# Patient Record
Sex: FEMALE | Race: WHITE | NOT HISPANIC OR LATINO | Employment: UNEMPLOYED | URBAN - METROPOLITAN AREA
[De-identification: names, ages, dates, MRNs, and addresses within clinical notes are randomized per-mention and may not be internally consistent; named-entity substitution may affect disease eponyms.]

---

## 2017-01-25 ENCOUNTER — OFFICE VISIT (OUTPATIENT)
Dept: FAMILY MEDICINE | Facility: CLINIC | Age: 47
End: 2017-01-25
Payer: COMMERCIAL

## 2017-01-25 VITALS
OXYGEN SATURATION: 98 % | BODY MASS INDEX: 21.21 KG/M2 | TEMPERATURE: 97 F | HEART RATE: 82 BPM | HEIGHT: 66 IN | RESPIRATION RATE: 16 BRPM | DIASTOLIC BLOOD PRESSURE: 74 MMHG | SYSTOLIC BLOOD PRESSURE: 113 MMHG | WEIGHT: 132 LBS

## 2017-01-25 DIAGNOSIS — Z51.81 ENCOUNTER FOR THERAPEUTIC DRUG MONITORING: ICD-10-CM

## 2017-01-25 DIAGNOSIS — L30.9 ECZEMA, UNSPECIFIED TYPE: Primary | ICD-10-CM

## 2017-01-25 DIAGNOSIS — F31.9 BIPOLAR 1 DISORDER (H): ICD-10-CM

## 2017-01-25 LAB — GLUCOSE SERPL-MCNC: 96 MG/DL (ref 70–99)

## 2017-01-25 PROCEDURE — 99214 OFFICE O/P EST MOD 30 MIN: CPT | Performed by: PHYSICIAN ASSISTANT

## 2017-01-25 PROCEDURE — 82947 ASSAY GLUCOSE BLOOD QUANT: CPT | Performed by: PHYSICIAN ASSISTANT

## 2017-01-25 PROCEDURE — 36415 COLL VENOUS BLD VENIPUNCTURE: CPT | Performed by: PHYSICIAN ASSISTANT

## 2017-01-25 RX ORDER — CETIRIZINE HYDROCHLORIDE 10 MG/1
10 TABLET ORAL EVERY EVENING
Qty: 30 TABLET | Refills: 11 | Status: SHIPPED | OUTPATIENT
Start: 2017-01-25 | End: 2018-02-07

## 2017-01-25 RX ORDER — TRIAMCINOLONE ACETONIDE 1 MG/G
CREAM TOPICAL
Qty: 30 G | Refills: 0 | Status: SHIPPED | OUTPATIENT
Start: 2017-01-25 | End: 2018-01-25

## 2017-01-25 RX ORDER — CLOBETASOL PROPIONATE 0.5 MG/G
AEROSOL, FOAM TOPICAL
Qty: 50 G | Refills: 1 | Status: SHIPPED | OUTPATIENT
Start: 2017-01-25 | End: 2018-01-25

## 2017-01-25 ASSESSMENT — PAIN SCALES - GENERAL: PAINLEVEL: NO PAIN (0)

## 2017-01-25 NOTE — NURSING NOTE
"Chief Complaint   Patient presents with     LAB REQUEST     Hair/Scalp Problem       Initial /74 mmHg  Pulse 82  Temp(Src) 97  F (36.1  C) (Oral)  Resp 16  Ht 5' 5.5\" (1.664 m)  Wt 132 lb (59.875 kg)  BMI 21.62 kg/m2  SpO2 98%  LMP 12/08/2016  Breastfeeding? No Estimated body mass index is 21.62 kg/(m^2) as calculated from the following:    Height as of this encounter: 5' 5.5\" (1.664 m).    Weight as of this encounter: 132 lb (59.875 kg).  BP completed using cuff size: regular    Pilar Mota MA       "

## 2017-01-25 NOTE — PATIENT INSTRUCTIONS
How to contact your care team: [phone] Pharmacy [phone]   KARYNA PARRA MD KATYA GEORGIEV, PA-C CHRIS JONES, PA-C NAM HO, MD JONATHAN BATES, MD ARVIN VOCAL, MD    Clinic hours M-Th 7am-7pm Fri 7am-5pm.   Urgent care M-F 11am-9pm  Sat/Sun 9am-5pm.   Pharmacy   Mon-Th:  8:00am-8pm   Fri:  8:00am-6:00pm  Sat/Sun  8:00am-5:00 pm

## 2017-01-25 NOTE — PROGRESS NOTES
SUBJECTIVE:                                                    CC: scalp itching, lab test    Denilson Sweet is a 46 year old female who presents to clinic today for: 1) Bipolar disorder.  Followed by psychiatry, stable.  Needs a fasting blood sugar for monitoring of zyprexa.  Last year, blood sugar with her 's work was normal.  2) Scalp itching, rash.  History of sensitive skin, eczema as a child.  Rash is on neck and in the flexural creases. Has tried a selsun blue and other seborrhea shampoos without relief.  OTC cortaid seems to help temporarily.      Problem list and histories reviewed & adjusted, as indicated.  Additional history: as documented    Patient Active Problem List   Diagnosis     Genital herpes     CARDIOVASCULAR SCREENING; LDL GOAL LESS THAN 160     Bipolar 1 disorder (H)     Migraine     Past Surgical History   Procedure Laterality Date     Laparoscopy procedure unlisted       ABLATION     Biopsy breast       RT       Social History   Substance Use Topics     Smoking status: Never Smoker      Smokeless tobacco: Never Used     Alcohol Use: No     Family History   Problem Relation Age of Onset     OSTEOPOROSIS Mother      Cancer - colorectal Paternal Grandmother          Current Outpatient Prescriptions   Medication Sig Dispense Refill     cetirizine (ZYRTEC) 10 MG tablet Take 1 tablet (10 mg) by mouth every evening 30 tablet 11     triamcinolone (KENALOG) 0.1 % cream Apply sparingly to affected area three times daily for 14 days. 30 g 0     clobetasol propionate 0.05 % FOAM Apply sparingly to affected area twice daily as needed.  Do not apply to face. 50 g 1     levonorgestrel-ethinyl estradiol (SEASONALE) 0.15-0.03 MG per tablet Take 1 tablet by mouth daily 91 tablet 3     LORazepam (ATIVAN) 0.5 MG tablet Take 1 tablet by mouth 2 times daily as needed.       OLANZapine (ZYPREXA) 20 MG tablet Take 2 tablets by mouth At Bedtime.       Omega-3 Fatty Acids (FISH OIL PO) Take  by mouth. Pt  "unsure dose       Multiple Vitamin (MULTI-VITAMIN PO) Take  by mouth.       AMBIEN 10 MG PO TABS 1 TABLET AT BEDTIME       CALCIUM 600 + D OR None Entered       No Known Allergies    ROS:  Constitutional, neuro, ENT, endocrine, pulmonary, cardiac, gastrointestinal, genitourinary, musculoskeletal, integument and psychiatric systems are negative, except as otherwise noted.  INTEGUMENTARY/SKIN: POSITIVE for rash, scalp itching  PSYCHIATRIC: POSITIVE for hx of bipolar disorder    OBJECTIVE:                                                    /74 mmHg  Pulse 82  Temp(Src) 97  F (36.1  C) (Oral)  Resp 16  Ht 5' 5.5\" (1.664 m)  Wt 132 lb (59.875 kg)  BMI 21.62 kg/m2  SpO2 98%  LMP 12/08/2016  Breastfeeding? No  Body mass index is 21.62 kg/(m^2).     GENERAL APPEARANCE: healthy, alert and no distress  NECK: no adenopathy, no asymmetry, masses, or scars and thyroid normal to palpation  RESP: lungs clear to auscultation - no rales, rhonchi or wheezes  CV: regular rates and rhythm, normal S1 S2, no S3 or S4 and no murmur, click or rub  MS: extremities normal- no gross deformities noted  SKIN: eczematous rash on cubital fossa and posterior neck  NEURO: Normal strength and tone, mentation intact and speech normal  PSYCH: mentation appears normal and affect normal/bright       ASSESSMENT/PLAN:                                                    (L30.9) Eczema, unspecified type  (primary encounter diagnosis)  Plan: cetirizine (ZYRTEC) 10 MG tablet- daily inolone        (KENALOG) 0.1 % cream- bid to tid to affected areas prn, clobetasol propionate         0.05 % FOAM to scalp bid prn        Avoid use for longer than 14 days to avoid skin thinning    (F31.9) Bipolar 1 disorder (H)  Comment: stable  Plan: Glucose     (Z51.81) Encounter for therapeutic drug monitoring  Comment: zyprexa  Plan: Glucose          Follow up with Provider - on lab results     Rebecca Childs PA-C  [facility]    "

## 2017-01-25 NOTE — MR AVS SNAPSHOT
After Visit Summary   1/25/2017    Denilson Sweet    MRN: [id number]           Patient Information     Date Of Birth          1970        Visit Information        Provider Department      1/25/2017 11:00 AM Rebecca Childs PA-C [facility]        Today's Diagnoses     Eczema, unspecified type    -  1     Bipolar 1 disorder (H)         Encounter for therapeutic drug monitoring           Care Instructions    How to contact your care team: [phone] Pharmacy [phone]   KARYNA PARRA MD KATYA GEORGIEV, PA-C CHRIS JONES, PA-C NAM HO, MD JONATHAN BATES, MD ARVIN VOCAL, MD    Clinic hours M-Th 7am-7pm Fri 7am-5pm.   Urgent care M-F 11am-9pm  Sat/Sun 9am-5pm.   Pharmacy   Mon-Th:  8:00am-8pm   Fri:  8:00am-6:00pm  Sat/Sun  8:00am-5:00 pm             Follow-ups after your visit        Who to contact     If you have questions or need follow up information about today's clinic visit or your schedule please contact [facility] directly at [phone].  Normal or non-critical lab and imaging results will be communicated to you by [employer], letter or phone within 4 business days after the clinic has received the results. If you do not hear from us within 7 days, please contact the clinic through [employer] or phone. If you have a critical or abnormal lab result, we will notify you by phone as soon as possible.  Submit refill requests through [facility] or call your pharmacy and they will forward the refill request to us. Please allow 3 business days for your refill to be completed.          Additional Information About Your Visit        MyChart Information     [facility] gives you secure access to your electronic health record. If you see a primary care provider, you can also send messages to your care team and make appointments. If you have questions, please call your primary care clinic.  If you do not have a primary care provider,  "please call [phone] and they will assist you.        Care EveryWhere ID     This is your Care EveryWhere ID. This could be used by other organizations to access your [place] medical records  [id number]        Your Vitals Were     Pulse Temperature Respirations    82 97  F (36.1  C) (Oral) 16    Height BMI (Body Mass Index) Pulse Oximetry    5' 5.5\" (1.664 m) 21.62 kg/m2 98%    Last Period Breastfeeding?       12/08/2016 No        Blood Pressure from Last 3 Encounters:   01/25/17 113/74   11/16/16 126/67   11/04/15 116/72    Weight from Last 3 Encounters:   01/25/17 132 lb (59.875 kg)   11/16/16 132 lb (59.875 kg)   11/04/15 145 lb (65.772 kg)              We Performed the Following     Glucose          Today's Medication Changes          These changes are accurate as of: 1/25/17 11:28 AM.  If you have any questions, ask your nurse or doctor.               Start taking these medicines.        Dose/Directions    cetirizine 10 MG tablet   Commonly known as:  zyrTEC   Used for:  Eczema, unspecified type   Started by:  Rebecca Cihlds PA-C        Dose:  10 mg   Take 1 tablet (10 mg) by mouth every evening   Quantity:  30 tablet   Refills:  11       clobetasol propionate 0.05 % Foam   Used for:  Eczema, unspecified type   Started by:  Rebecca Childs PA-C        Apply sparingly to affected area twice daily as needed.  Do not apply to face.   Quantity:  50 g   Refills:  1       triamcinolone 0.1 % cream   Commonly known as:  KENALOG   Used for:  Eczema, unspecified type   Started by:  Rebecca Childs PA-C        Apply sparingly to affected area three times daily for 14 days.   Quantity:  30 g   Refills:  0            Where to get your medicines      These medications were sent to [facility]/pharmacy #[id number] - [place], [place] - [address]  [address] [address]     Phone:  [phone]    - cetirizine 10 MG tablet  - clobetasol propionate 0.05 % Foam  - triamcinolone 0.1 % " cream             Primary Care Provider Office Phone # Fax #    Rebecca Childs PA-C [phone]       [facility] [address]  [place] [address]        Thank you!     Thank you for choosing [facility]  for your care. Our goal is always to provide you with excellent care. Hearing back from our patients is one way we can continue to improve our services. Please take a few minutes to complete the written survey that you may receive in the mail after your visit with us. Thank you!             Your Updated Medication List - Protect others around you: Learn how to safely use, store and throw away your medicines at www.disposemymeds.org.          This list is accurate as of: 1/25/17 11:28 AM.  Always use your most recent med list.                   Brand Name Dispense Instructions for use    AMBIEN 10 MG tablet   Generic drug:  zolpidem      1 TABLET AT BEDTIME       CALCIUM 600 + D PO      None Entered       cetirizine 10 MG tablet    zyrTEC    30 tablet    Take 1 tablet (10 mg) by mouth every evening       clobetasol propionate 0.05 % Foam     50 g    Apply sparingly to affected area twice daily as needed.  Do not apply to face.       FISH OIL PO      Take  by mouth. Pt unsure dose       levonorgestrel-ethinyl estradiol 0.15-0.03 MG per tablet    SEASONALE    91 tablet    Take 1 tablet by mouth daily       LORazepam 0.5 MG tablet    ATIVAN     Take 1 tablet by mouth 2 times daily as needed.       MULTI-VITAMIN PO      Take  by mouth.       OLANZapine 20 MG tablet    zyPREXA     Take 2 tablets by mouth At Bedtime.       triamcinolone 0.1 % cream    KENALOG    30 g    Apply sparingly to affected area three times daily for 14 days.

## 2017-04-04 ENCOUNTER — RADIANT APPOINTMENT (OUTPATIENT)
Dept: GENERAL RADIOLOGY | Facility: CLINIC | Age: 47
End: 2017-04-04
Attending: PHYSICIAN ASSISTANT
Payer: COMMERCIAL

## 2017-04-04 ENCOUNTER — OFFICE VISIT (OUTPATIENT)
Dept: FAMILY MEDICINE | Facility: CLINIC | Age: 47
End: 2017-04-04
Payer: COMMERCIAL

## 2017-04-04 VITALS
HEIGHT: 65 IN | TEMPERATURE: 97 F | SYSTOLIC BLOOD PRESSURE: 119 MMHG | HEART RATE: 86 BPM | OXYGEN SATURATION: 98 % | WEIGHT: 127.13 LBS | DIASTOLIC BLOOD PRESSURE: 56 MMHG | BODY MASS INDEX: 21.18 KG/M2

## 2017-04-04 DIAGNOSIS — M25.552 HIP PAIN, LEFT: ICD-10-CM

## 2017-04-04 DIAGNOSIS — M76.892 HIP FLEXOR TENDINITIS, LEFT: Primary | ICD-10-CM

## 2017-04-04 DIAGNOSIS — H01.139 EYELID DERMATITIS, ECZEMATOUS, UNSPECIFIED LATERALITY: ICD-10-CM

## 2017-04-04 PROCEDURE — 73502 X-RAY EXAM HIP UNI 2-3 VIEWS: CPT | Mod: LT

## 2017-04-04 PROCEDURE — 99214 OFFICE O/P EST MOD 30 MIN: CPT | Performed by: PHYSICIAN ASSISTANT

## 2017-04-04 RX ORDER — METHOCARBAMOL 750 MG/1
750 TABLET, FILM COATED ORAL 3 TIMES DAILY PRN
Qty: 30 TABLET | Refills: 1 | Status: SHIPPED | OUTPATIENT
Start: 2017-04-04 | End: 2018-01-25

## 2017-04-04 RX ORDER — TRIAMCINOLONE ACETONIDE 0.25 MG/G
CREAM TOPICAL 2 TIMES DAILY
Qty: 45 G | Refills: 1 | Status: SHIPPED | OUTPATIENT
Start: 2017-04-04 | End: 2018-01-25

## 2017-04-04 RX ORDER — NAPROXEN 500 MG/1
500 TABLET ORAL 2 TIMES DAILY WITH MEALS
Qty: 60 TABLET | Refills: 1 | Status: SHIPPED | OUTPATIENT
Start: 2017-04-04 | End: 2018-02-07

## 2017-04-04 NOTE — NURSING NOTE
"Chief Complaint   Patient presents with     Musculoskeletal Problem       Initial /56  Pulse 86  Temp 97  F (36.1  C) (Oral)  Ht 5' 5\" (1.651 m)  Wt 127 lb 2 oz (57.7 kg)  SpO2 98%  Breastfeeding? No  BMI 21.15 kg/m2 Estimated body mass index is 21.15 kg/(m^2) as calculated from the following:    Height as of this encounter: 5' 5\" (1.651 m).    Weight as of this encounter: 127 lb 2 oz (57.7 kg).  Medication Reconciliation: complete     Annetta Shultz CMA      "

## 2017-04-04 NOTE — PROGRESS NOTES
SUBJECTIVE:                                                    Denilson Sweet is a 46 year old female who presents to clinic today for the following health issues:      Hip Pain     Onset: 6 months got worse 2-3 weeks     Description:   Location: anterior left hip   Character: Sharp and Dull ache    Intensity: 8/10    Progression of Symptoms: worse    Accompanying Signs & Symptoms:  Other symptoms: none   History:   Previous similar pain: no       Precipitating factors:   Trauma or overuse: YES- speed walks     Alleviating factors:  Improved by: nothing  Eczema  Has noticed that it came on face  Itchy, red eyelids     Therapies Tried and outcome: aleve          Problem list and histories reviewed & adjusted, as indicated.  Additional history: as documented    Patient Active Problem List   Diagnosis     Genital herpes     CARDIOVASCULAR SCREENING; LDL GOAL LESS THAN 160     Bipolar 1 disorder (H)     Migraine     Past Surgical History:   Procedure Laterality Date     BIOPSY BREAST      RT     LAPAROSCOPY PROCEDURE UNLISTED      ABLATION       Social History   Substance Use Topics     Smoking status: Never Smoker     Smokeless tobacco: Never Used     Alcohol use No     Family History   Problem Relation Age of Onset     OSTEOPOROSIS Mother      Cancer - colorectal Paternal Grandmother          Current Outpatient Prescriptions   Medication Sig Dispense Refill     triamcinolone (KENALOG) 0.025 % cream Apply topically 2 times daily Thin layer to face, use 2 weeks on, 1 week off 45 g 1     methocarbamol (ROBAXIN) 750 MG tablet Take 1 tablet (750 mg) by mouth 3 times daily as needed for muscle spasms 30 tablet 1     naproxen (NAPROSYN) 500 MG tablet Take 1 tablet (500 mg) by mouth 2 times daily (with meals) 60 tablet 1     cetirizine (ZYRTEC) 10 MG tablet Take 1 tablet (10 mg) by mouth every evening 30 tablet 11     triamcinolone (KENALOG) 0.1 % cream Apply sparingly to affected area three times daily for 14 days. 30 g 0  "    clobetasol propionate 0.05 % FOAM Apply sparingly to affected area twice daily as needed.  Do not apply to face. 50 g 1     levonorgestrel-ethinyl estradiol (SEASONALE) 0.15-0.03 MG per tablet Take 1 tablet by mouth daily 91 tablet 3     LORazepam (ATIVAN) 0.5 MG tablet Take 1 tablet by mouth 2 times daily as needed.       OLANZapine (ZYPREXA) 20 MG tablet Take 2 tablets by mouth At Bedtime.       Omega-3 Fatty Acids (FISH OIL PO) Take  by mouth. Pt unsure dose       Multiple Vitamin (MULTI-VITAMIN PO) Take  by mouth.       AMBIEN 10 MG PO TABS 1 TABLET AT BEDTIME       CALCIUM 600 + D OR None Entered       No Known Allergies    Reviewed and updated as needed this visit by clinical staff       Reviewed and updated as needed this visit by Provider         ROS:  Constitutional, HEENT, cardiovascular, pulmonary, gi and gu systems are negative, except as otherwise noted.    OBJECTIVE:                                                    /56  Pulse 86  Temp 97  F (36.1  C) (Oral)  Ht 5' 5\" (1.651 m)  Wt 127 lb 2 oz (57.7 kg)  SpO2 98%  Breastfeeding? No  BMI 21.15 kg/m2  Body mass index is 21.15 kg/(m^2).  GENERAL: healthy, alert and no distress  EYES: conjunctivae and sclerae normal and eyelids- dry, erythematous skin  MS: left hip exam shows normal strength and muscle mass, no deformities, no erythema, induration, or nodules, no evidence of joint effusion, ROM  is normal and no evidence of joint instability, tenderness on palpation of the left anterior hip flexors specifically sartorius region.     Diagnostic Test Results:  Xray - no fractures, no severe arthritis.      ASSESSMENT/PLAN:                                                        ICD-10-CM    1. Hip pain, left M25.552 XR Hip Left 2-3 Views   2. Eyelid dermatitis, eczematous, unspecified laterality H01.139 triamcinolone (KENALOG) 0.025 % cream   3. Hip flexor tendinitis, left M76.892 methocarbamol (ROBAXIN) 750 MG tablet     naproxen (NAPROSYN) " 500 MG tablet   1.Do daily stretches  Take Naproxen 500 mg twice a day with food for 2 weeks  Robaxin 750 mg three times a day for tightness as needed   Apply ice pack after prolonged walks   2. Triamcinolone 0.025% twice a day , use 2 weeks on, 1 week off.       Lisa Ennis PA-C  [facility]

## 2017-04-04 NOTE — PATIENT INSTRUCTIONS
Do daily stretches  Take Naproxen 500 mg twice a day with food for 2 weeks  Robaxin 750 mg three times a day for tightness as needed   Apply ice pack after prolonged walks   Lower Body Exercises: Hip Flexor        This exercise stretches and strengthens your lower body to help your back. As you work out, don t rush or strain. Use an exercise mat, pillow, or folded towel to protect your knees and other sensitive areas.    Kneel on the floor. Put one foot on the floor in front of you, with the knee slightly bent. If you need to, hold on to a chair for balance. Tighten your abdomen.    Move your hips forward, keeping your back and shoulders upright. Feel the stretch in the front of your hip.    Hold for 30-60 seconds. Return to starting position.    Repeat 2 times. Switch sides.     Repeat ___ times per day.  For your safety, check with your healthcare provider before starting an exercise program.     6351-1187 [employer]. [address] [place] [address], [place] [id number]. All rights reserved. This information is not intended as a substitute for professional medical care. Always follow your healthcare professional's instructions.

## 2017-04-04 NOTE — MR AVS SNAPSHOT
After Visit Summary   4/4/2017    Denilson Sweet    MRN: [id number]           Patient Information     Date Of Birth          1970        Visit Information        Provider Department      4/4/2017 3:20 PM Lisa Ennis PA-C [facility]        Today's Diagnoses     Hip pain, left    -  1    Eyelid dermatitis, eczematous, unspecified laterality        Hip flexor tendinitis, left          Care Instructions    Do daily stretches  Take Naproxen 500 mg twice a day with food for 2 weeks  Robaxin 750 mg three times a day for tightness as needed   Apply ice pack after prolonged walks   Lower Body Exercises: Hip Flexor        This exercise stretches and strengthens your lower body to help your back. As you work out, don t rush or strain. Use an exercise mat, pillow, or folded towel to protect your knees and other sensitive areas.    Kneel on the floor. Put one foot on the floor in front of you, with the knee slightly bent. If you need to, hold on to a chair for balance. Tighten your abdomen.    Move your hips forward, keeping your back and shoulders upright. Feel the stretch in the front of your hip.    Hold for 30-60 seconds. Return to starting position.    Repeat 2 times. Switch sides.     Repeat ___ times per day.  For your safety, check with your healthcare provider before starting an exercise program.     3585-8225 [employer]. [address] [place] [address]. All rights reserved. This information is not intended as a substitute for professional medical care. Always follow your healthcare professional's instructions.              Follow-ups after your visit        Who to contact     If you have questions or need follow up information about today's clinic visit or your schedule please contact [facility] directly at [phone].  Normal or non-critical lab and imaging results will be communicated to you by Reggie, letter  "or phone within 4 business days after the clinic has received the results. If you do not hear from us within 7 days, please contact the clinic through [employer] or phone. If you have a critical or abnormal lab result, we will notify you by phone as soon as possible.  Submit refill requests through [employer] or call your pharmacy and they will forward the refill request to us. Please allow 3 business days for your refill to be completed.          Additional Information About Your Visit        Bad[employer] Information     [employer] gives you secure access to your electronic health record. If you see a primary care provider, you can also send messages to your care team and make appointments. If you have questions, please call your primary care clinic.  If you do not have a primary care provider, please call [phone] and they will assist you.        Care EveryWhere ID     This is your Care EveryWhere ID. This could be used by other organizations to access your [place] medical records  [id number]        Your Vitals Were     Pulse Temperature Height Pulse Oximetry Breastfeeding? BMI (Body Mass Index)    86 97  F (36.1  C) (Oral) 5' 5\" (1.651 m) 98% No 21.15 kg/m2       Blood Pressure from Last 3 Encounters:   04/04/17 119/56   01/25/17 113/74   11/16/16 126/67    Weight from Last 3 Encounters:   04/04/17 127 lb 2 oz (57.7 kg)   01/25/17 132 lb (59.9 kg)   11/16/16 132 lb (59.9 kg)                 Today's Medication Changes          These changes are accurate as of: 4/4/17  4:06 PM.  If you have any questions, ask your nurse or doctor.               Start taking these medicines.        Dose/Directions    methocarbamol 750 MG tablet   Commonly known as:  ROBAXIN   Used for:  Hip flexor tendinitis, left   Started by:  Lisa Ennis PA-C        Dose:  750 mg   Take 1 tablet (750 mg) by mouth 3 times daily as needed for muscle spasms   Quantity:  30 tablet   Refills:  1       naproxen 500 MG tablet   Commonly " known as:  NAPROSYN   Used for:  Hip flexor tendinitis, left   Started by:  Lisa Ennis PA-C        Dose:  500 mg   Take 1 tablet (500 mg) by mouth 2 times daily (with meals)   Quantity:  60 tablet   Refills:  1         These medicines have changed or have updated prescriptions.        Dose/Directions    * triamcinolone 0.1 % cream   Commonly known as:  KENALOG   This may have changed:  Another medication with the same name was added. Make sure you understand how and when to take each.   Used for:  Eczema, unspecified type   Changed by:  Rebecca Childs PA-C        Apply sparingly to affected area three times daily for 14 days.   Quantity:  30 g   Refills:  0       * triamcinolone 0.025 % cream   Commonly known as:  KENALOG   This may have changed:  You were already taking a medication with the same name, and this prescription was added. Make sure you understand how and when to take each.   Used for:  Eyelid dermatitis, eczematous, unspecified laterality   Changed by:  Lisa Ennis PA-C        Apply topically 2 times daily Thin layer to face, use 2 weeks on, 1 week off   Quantity:  45 g   Refills:  1       * Notice:  This list has 2 medication(s) that are the same as other medications prescribed for you. Read the directions carefully, and ask your doctor or other care provider to review them with you.         Where to get your medicines      These medications were sent to [facility]/pharmacy #[id number] - [place], [place] - [address]  [address] [facility] [address]     Phone:  [phone]     methocarbamol 750 MG tablet    naproxen 500 MG tablet    triamcinolone 0.025 % cream                Primary Care Provider Office Phone # Fax #    Rebecca Childs PA-C [phone]       [facility] [address]  [facility] [address]        Thank you!     Thank you for choosing [facility]  for your care. Our goal is  always to provide you with excellent care. Hearing back from our patients is one way we can continue to improve our services. Please take a few minutes to complete the written survey that you may receive in the mail after your visit with us. Thank you!             Your Updated Medication List - Protect others around you: Learn how to safely use, store and throw away your medicines at www.disposemymeds.org.          This list is accurate as of: 4/4/17  4:06 PM.  Always use your most recent med list.                   Brand Name Dispense Instructions for use    AMBIEN 10 MG tablet   Generic drug:  zolpidem      1 TABLET AT BEDTIME       CALCIUM 600 + D PO      None Entered       cetirizine 10 MG tablet    zyrTEC    30 tablet    Take 1 tablet (10 mg) by mouth every evening       clobetasol propionate 0.05 % Foam     50 g    Apply sparingly to affected area twice daily as needed.  Do not apply to face.       FISH OIL PO      Take  by mouth. Pt unsure dose       levonorgestrel-ethinyl estradiol 0.15-0.03 MG per tablet    SEASONALE    91 tablet    Take 1 tablet by mouth daily       LORazepam 0.5 MG tablet    ATIVAN     Take 1 tablet by mouth 2 times daily as needed.       methocarbamol 750 MG tablet    ROBAXIN    30 tablet    Take 1 tablet (750 mg) by mouth 3 times daily as needed for muscle spasms       MULTI-VITAMIN PO      Take  by mouth.       naproxen 500 MG tablet    NAPROSYN    60 tablet    Take 1 tablet (500 mg) by mouth 2 times daily (with meals)       OLANZapine 20 MG tablet    zyPREXA     Take 2 tablets by mouth At Bedtime.       * triamcinolone 0.1 % cream    KENALOG    30 g    Apply sparingly to affected area three times daily for 14 days.       * triamcinolone 0.025 % cream    KENALOG    45 g    Apply topically 2 times daily Thin layer to face, use 2 weeks on, 1 week off       * Notice:  This list has 2 medication(s) that are the same as other medications prescribed for you. Read the directions carefully,  and ask your doctor or other care provider to review them with you.

## 2017-06-01 ENCOUNTER — OFFICE VISIT (OUTPATIENT)
Dept: FAMILY MEDICINE | Facility: CLINIC | Age: 47
End: 2017-06-01
Payer: COMMERCIAL

## 2017-06-01 VITALS
BODY MASS INDEX: 22.16 KG/M2 | SYSTOLIC BLOOD PRESSURE: 116 MMHG | HEART RATE: 96 BPM | HEIGHT: 65 IN | DIASTOLIC BLOOD PRESSURE: 69 MMHG | TEMPERATURE: 98.1 F | OXYGEN SATURATION: 96 % | WEIGHT: 133 LBS

## 2017-06-01 DIAGNOSIS — S60.229A CONTUSION OF HAND, UNSPECIFIED LATERALITY, INITIAL ENCOUNTER: ICD-10-CM

## 2017-06-01 DIAGNOSIS — S40.011A CONTUSION, SHOULDER AND UPPER ARM, MULTIPLE SITES, RIGHT, INITIAL ENCOUNTER: ICD-10-CM

## 2017-06-01 DIAGNOSIS — S40.021A CONTUSION, SHOULDER AND UPPER ARM, MULTIPLE SITES, RIGHT, INITIAL ENCOUNTER: ICD-10-CM
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
## 2017-06-01 DIAGNOSIS — Z23 NEED FOR TDAP VACCINATION: ICD-10-CM

## 2017-06-01 PROCEDURE — 90715 TDAP VACCINE 7 YRS/> IM: CPT | Performed by: FAMILY MEDICINE

## 2017-06-01 PROCEDURE — 99213 OFFICE O/P EST LOW 20 MIN: CPT | Mod: 25 | Performed by: FAMILY MEDICINE

## 2017-06-01 PROCEDURE — 90471 IMMUNIZATION ADMIN: CPT | Performed by: FAMILY MEDICINE

## 2017-06-01 RX ORDER — TRAZODONE HYDROCHLORIDE 100 MG/1
TABLET ORAL
Refills: 3 | COMMUNITY
Start: 2017-05-22 | End: 2019-11-06

## 2017-06-01 RX ORDER — ZOLPIDEM TARTRATE 5 MG/1
10 TABLET ORAL
COMMUNITY

## 2017-06-01 ASSESSMENT — PAIN SCALES - GENERAL: PAINLEVEL: SEVERE PAIN (7)
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
## 2017-06-01 NOTE — PATIENT INSTRUCTIONS
How to contact your care team: [phone] Pharmacy [phone]   KARYNA PARRA MD KATYA GEORGIEV, PA-C CHRIS JONES, PA-C NAM HO, MD JONATHAN BATES, MD ARVIN VOCAL, MD    Clinic hours M-Th 7am-7pm Fri 7am-5pm.   Urgent care M-F 11am-9pm  Sat/Sun 9am-5pm.   Pharmacy   Mon-Th:  8:00am-8pm   Fri:  8:00am-6:00pm  Sat/Sun  8:00am-5:00 pm       Shoulder Contusion  You have a shoulder injury called a contusion. This causes pain, swelling, and sometimes bruising on the skin. You don t have any broken bones. This injury will take from a few days to 6 weeks to heal, depending on how severe it is. Moderate to severe shoulder contusions are treated with a sling or shoulder immobilizer. Minor contusions can be treated without any special support.  Home care  Follow these tips when caring for yourself at home:    If you were given a sling to use, leave it in place for the time advised by your health care provider. If you aren t sure how long to wear it, ask for advice. If the sling becomes loose, adjust it so that your forearm is level with the ground. Your shoulder should feel well supported.    Put an ice pack on the injured area for 20 minutes every 1 to 2 hours the first day. You can make your own ice pack by putting ice cubes in a plastic bag. Wrap the bag in a thin towel. Continue with ice packs 3 to 4 times a day for the next 2 days. Then use the pack as needed to ease pain and swelling.    You may use acetaminophen or ibuprofen to control pain, unless another pain medicine was prescribed. If you have chronic liver or kidney disease, talk with your health care provider before using these medicines. Also talk with your provider if you ve had a stomach ulcer or GI bleeding.    Shoulder joints become stiff if left in a sling for too long. You should start range of motion exercises about 10 days after the injury. Talk with your provider to find out what type of exercises to do and how soon to  start.    Unless your provider told you otherwise, you can take the sling off to shower or bathe.  Follow-up care  Follow up with your health care provider if you don t start getting better in the next 5 days.  When to seek medical advice  Call your health care provider right away if any of these occur:    Pain or swelling gets worse or continues for more than a few days    Large amount of bruising on your shoulder or upper arm    Your hand or fingers become cold, blue, numb, or tingly    Difficulty moving your hand or fingers    Weakness in your hand or fingers    Your shoulder becomes stiff    Your shoulder feels like it is popping out    You aren t able to do your daily activities       0916-4207 [employer]. [address] [place] [address], [place] [address]. All rights reserved. This information is not intended as a substitute for professional medical care. Always follow your healthcare professional's instructions.        Upper Extremity Contusion  You have a contusion (bruise) of an upper extremity (arm, wrist, hand, or fingers). Symptoms include pain, swelling, and skin discoloration. No bones are broken. This injury may take from a few days to a few weeks to heal.  During that time, the bruise may change from reddish in color, to purple-blue, to green-yellow, to yellow-brown.  Home care    Unless another medication was prescribed, you can take acetaminophen, ibuprofen, or naproxen to control pain. (If you have chronic liver or kidney disease or ever had a stomach ulcer or GI bleeding, talk with your doctor before using these medicines.)    Elevate the injured area to reduce pain and swelling. As much as possible, sit or lie down with the injured area raised about the level of your heart. This is especially important during the first 48 hours.    Ice the injured area to help reduce pain and swelling. Wrap a cold source (ice pack or ice cubes in a plastic bag) in a thin towel. Apply to the bruised area  for 20 minutes every 1 to 2 hours the first day. Continue this 3 to 4 times a day until the pain and swelling goes away.    If a sling was provided, you may remove it to shower or bathe. To prevent joint stiffness, do not wear it for more than one week.  Follow up  Follow up with your health care provider or our staff as advised. Call if you are not improving within the next 1 to 2 weeks.  When to seek medical advice   Call your health care provider right away if any of these occur:    Increased pain or swelling    Hand or fingers become cold, blue, numb or tingly    Signs of infection: Warmth, drainage, or increased redness or pain around the injury    Inability to move the injured body part     Frequent bruising for unknown reasons    5927-1686 [employer]. [address] [place] [address], [place] [address]. All rights reserved. This information is not intended as a substitute for professional medical care. Always follow your healthcare professional's instructions.        Abrasions  Abrasions are skin scrapes. Their treatment depends on how large and deep the abrasion is.  Home care   You may be prescribed an antibiotic cream or ointment to apply to the wound. This helps prevent infection. Follow instructions when using this medication.  General care    To care for the abrasion, do the following each day for as long as directed by your health care provider.    If you were given a bandage, change it once a day. If your bandage sticks to the wound, soak it in warm water until it loosens.    Wash the area with soap and warm water. You may do this in a sink or under a tub faucet or shower. Rinse off the soap. Then pat the area dry with a clean towel.    If antibiotic ointment or cream was prescribed, reapply it to the wound as directed. Cover the wound with a fresh non-stick bandage. If the bandage becomes wet or dirty, change it as soon as possible.    You may use acetaminophen or ibuprofen to control pain  unless another pain medication was prescribed. Note: If you have chronic liver or kidney disease or ever had a stomach ulcer or GI bleeding, talk with your health care provider before using these medications. Do not use ibuprofen in children under six months of age.    Most skin wounds heal within ten days. But an infection may occur despite treatment. Therefore, monitor the wound for signs of infection as listed below.  Follow-up care  Follow up with your health care provider, or as advised.  When to seek medical advice  Call your health care provider right away if any of these occur:    Fever of 101 F (38.3 C) or higher, or as directed by your health care provider    Increasing pain, redness, swelling, or drainage from the wound    Bleeding from the wound that does not stop after a few minutes of steady, firm pressure    Decreased ability to move any body part near wound    6040-5398 [employer]. [address] [place] [address]. All rights reserved. This information is not intended as a substitute for professional medical care. Always follow your healthcare professional's instructions.
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
## 2017-07-07 ENCOUNTER — RADIANT APPOINTMENT (OUTPATIENT)
Dept: GENERAL RADIOLOGY | Facility: CLINIC | Age: 47
End: 2017-07-07
Attending: PHYSICIAN ASSISTANT
Payer: COMMERCIAL

## 2017-07-07 ENCOUNTER — OFFICE VISIT (OUTPATIENT)
Dept: FAMILY MEDICINE | Facility: CLINIC | Age: 47
End: 2017-07-07
Payer: COMMERCIAL

## 2017-07-07 VITALS
BODY MASS INDEX: 22.16 KG/M2 | TEMPERATURE: 96.7 F | SYSTOLIC BLOOD PRESSURE: 121 MMHG | WEIGHT: 133 LBS | OXYGEN SATURATION: 99 % | DIASTOLIC BLOOD PRESSURE: 69 MMHG | HEIGHT: 65 IN | HEART RATE: 90 BPM

## 2017-07-07 DIAGNOSIS — T14.8XXA BLISTERED SKIN: ICD-10-CM

## 2017-07-07 DIAGNOSIS — Z30.41 ENCOUNTER FOR SURVEILLANCE OF CONTRACEPTIVE PILLS: ICD-10-CM

## 2017-07-07 DIAGNOSIS — S49.91XD: ICD-10-CM

## 2017-07-07 DIAGNOSIS — S52.121A CLOSED DISPLACED FRACTURE OF HEAD OF RIGHT RADIUS, INITIAL ENCOUNTER: Primary | ICD-10-CM

## 2017-07-07 DIAGNOSIS — M25.521 RIGHT ELBOW PAIN: ICD-10-CM

## 2017-07-07 PROCEDURE — 73030 X-RAY EXAM OF SHOULDER: CPT | Mod: RT

## 2017-07-07 PROCEDURE — 99214 OFFICE O/P EST MOD 30 MIN: CPT | Performed by: PHYSICIAN ASSISTANT

## 2017-07-07 PROCEDURE — 73110 X-RAY EXAM OF WRIST: CPT | Mod: RT

## 2017-07-07 PROCEDURE — 73080 X-RAY EXAM OF ELBOW: CPT | Mod: RT

## 2017-07-07 RX ORDER — LEVONORGESTREL AND ETHINYL ESTRADIOL 0.15-0.03
1 KIT ORAL DAILY
Qty: 91 TABLET | Refills: 3 | Status: CANCELLED | OUTPATIENT
Start: 2017-07-07

## 2017-07-07 ASSESSMENT — PAIN SCALES - GENERAL: PAINLEVEL: NO PAIN (0)

## 2017-07-07 NOTE — PROGRESS NOTES
SUBJECTIVE:                                                    Denilson Sweet is a 46 year old female who presents to clinic today for the following health issues:      Medication Followup of Birth cobtrol    Taking Medication as prescribed: yes    Side Effects:  None    Medication Helping Symptoms:  Yes    Patient missed her last period. Initially patient was placed on oral contraception due to severe cramps during menses. Patient reports that she is infertile due to endometriosis and tubal injury. She doesnot suspect pregnancy.     Patient has not missed any oral contraception doses.      Musculoskeletal problem/pain      Duration: x 5/31/17    Description  Location: right arm    Intensity:  moderate    Accompanying signs and symptoms: loss of range of motion in elbow    History  Previous similar problem: YES    Previous evaluation:  Seen 6/1/17, no xray was done.     Precipitating or alleviating factors:  Trauma or overuse: YES  Aggravating factors include: exercise and overuse. Patient was told she has a contusion and that she could return to exercise in 2 weeks.     Therapies tried and outcome: nothing    Blisters       Duration: x 2 weeks    Description (location/character/radiation): both feet    Intensity:  mild    Accompanying signs and symptoms: none    History (similar episodes/previous evaluation): None    Precipitating or alleviating factors: None    Therapies tried and outcome: None           Problem list and histories reviewed & adjusted, as indicated.  Additional history: as documented    Patient Active Problem List   Diagnosis     Genital herpes     CARDIOVASCULAR SCREENING; LDL GOAL LESS THAN 160     Bipolar 1 disorder (H)     Migraine     Closed displaced fracture of head of right radius, initial encounter     Past Surgical History:   Procedure Laterality Date     BIOPSY BREAST      RT     LAPAROSCOPY PROCEDURE UNLISTED      ABLATION       Social History   Substance Use Topics     Smoking  "status: Never Smoker     Smokeless tobacco: Never Used     Alcohol use No     Family History   Problem Relation Age of Onset     OSTEOPOROSIS Mother      Cancer - colorectal Paternal Grandmother          Current Outpatient Prescriptions   Medication Sig Dispense Refill     traZODone (DESYREL) 100 MG tablet TAKE 1 TABLET BY MOUTH DAILY AT BEDTIME.  3     zolpidem (AMBIEN) 5 MG tablet Take by mouth nightly as needed for sleep       triamcinolone (KENALOG) 0.025 % cream Apply topically 2 times daily Thin layer to face, use 2 weeks on, 1 week off 45 g 1     methocarbamol (ROBAXIN) 750 MG tablet Take 1 tablet (750 mg) by mouth 3 times daily as needed for muscle spasms 30 tablet 1     naproxen (NAPROSYN) 500 MG tablet Take 1 tablet (500 mg) by mouth 2 times daily (with meals) 60 tablet 1     cetirizine (ZYRTEC) 10 MG tablet Take 1 tablet (10 mg) by mouth every evening 30 tablet 11     triamcinolone (KENALOG) 0.1 % cream Apply sparingly to affected area three times daily for 14 days. 30 g 0     clobetasol propionate 0.05 % FOAM Apply sparingly to affected area twice daily as needed.  Do not apply to face. 50 g 1     levonorgestrel-ethinyl estradiol (SEASONALE) 0.15-0.03 MG per tablet Take 1 tablet by mouth daily 91 tablet 3     LORazepam (ATIVAN) 0.5 MG tablet Take 1 tablet by mouth 2 times daily as needed.       OLANZapine (ZYPREXA) 20 MG tablet Take 2 tablets by mouth At Bedtime.       Omega-3 Fatty Acids (FISH OIL PO) Take  by mouth. Pt unsure dose       Multiple Vitamin (MULTI-VITAMIN PO) Take  by mouth.       CALCIUM 600 + D OR None Entered       No Known Allergies    Reviewed and updated as needed this visit by clinical staff  Tobacco  Allergies  Meds       Reviewed and updated as needed this visit by Provider         ROS:  Constitutional, HEENT, cardiovascular, pulmonary, gi and gu systems are negative, except as otherwise noted.    OBJECTIVE:     /69  Pulse 90  Temp 96.7  F (35.9  C) (Oral)  Ht 5' 5\" " (1.651 m)  Wt 133 lb (60.3 kg)  SpO2 99%  Breastfeeding? No  BMI 22.13 kg/m2  Body mass index is 22.13 kg/(m^2).  GENERAL: healthy, alert and no distress  MS: right elbow, mild swelling around radial head, LROM, incomplete extension, very tender with any attempted ROM..  Right wrist: no swelling, no erythema, no bruising, NROM  R shoulder:no swelling, no erythema, no bruising, NROM    SKIN: bilateral feet: healing dry blisters on the IP joints of several toes, no erythema, no swelling no tenderness, no drainage.   Diagnostic Test Results:  Xray - elbow xray- mildly displaced radial head fracture  wrist and shoulder xray is within normal limits.    ASSESSMENT/PLAN:       ICD-10-CM    1. Closed displaced fracture of head of right radius, initial encounter S52.121A ORTHO  REFERRAL   2. Injury of arm, right, subsequent encounter S49.91XD XR Wrist Right G/E 3 Views     XR Shoulder Right G/E 3 Views   3. Blistered skin T14.8    4. Encounter for surveillance of contraceptive pills Z30.41      1.Take Ibuprofen 600 mg every 6 hours as needed  for pain  Avoid excessive ROM and no lifting heavy items until cleared by orthopedist.   Patient declined arm sling.   Patient was explained that she may need to have cast or potentially a surgery per ortho decision.   2. Wrist and shoulder contusion is healing.  3. Healing naturally.  4. Patient would like to stop oral contraception and wait for cycle to restore naturally.   Patient declined UPT today.   Patient will follow up in 1 month if she does not get the menses to have FSH tested to rule out menopause.     Lisa Ennis PA-C  [facility]

## 2017-07-07 NOTE — PATIENT INSTRUCTIONS
Take Ibuprofen 600 mg every 6 hours as needed  for pain  Avoid excessive ROM and no lifting heavy items until cleared by orthopedist.       At [facility], we strive to deliver an exceptional experience to you, every time we see you.    If you receive a survey in the mail, please send us back your thoughts. We really do value your feedback.    Thank you for visiting [facility]    Normal or non-critical lab and imaging results will be communicated to you by MyChart, letter or phone within 7 days.  If you do not hear from us within 10 days, please call the clinic. If you have a critical or abnormal lab result, we will notify you by phone as soon as possible.     If you have any questions regarding your visit please contact:     Team [facility]  Clinic Hours Telephone Number   Dr. Juan Richardsonluis   7am-7pm  Monday through Thursday  7am-5pm Friday [phone]  Kamilah SCHAFFER RN   Pharmacy 8:30am-9pm Monday-Friday    9am-5pm Saturday-Sunday [phone]   Urgent Care 11am-9pm Monday-Friday        9am-5pm Saturday-Sunday [phone]     After hours, weekend or if you need to make an appointment with your primary provider please call [phone].   After Hours nurse advise: call [facility]: [phone]    Use [employer] (secure email communication and access to your chart) to send your primary care provider a message or make an appointment. Ask someone on your Team how to sign up for [employer]. To log on to "[employer]" or for more information in [employer] please visit the website at [email or web address].   As of October 8, 2013, all password changes, disabled accounts, or ID changes in [employer]/MyHealth will be done by our Access Services Department.   If you need help with your account or password, call: [phone]. Clinic staff no longer has the ability to change  passwords.     Elbow Fracture    You have a break (fracture) of one or more bones of your elbow joint. This may be a small crack in the bone. Or it may be a major break, with the broken parts pushed out of position.  This fracture usually takes 4 to 12 weeks to heal, depending on the type. The first step in treatment is with a splint or cast. Severe fractures may need surgery to put the bone fragments back into place.  Home care  Follow these guidelines when caring for yourself at home:    Keep your arm elevated to reduce pain and swelling. When sitting or lying down keep your arm above the level of your heart. You can do this by placing your arm on a pillow that rests on your chest or on a pillow at your side. This is most important during the first 2 days (48 hours) after the injury.    Put an ice pack on the injured area. Do this for 20 minutes every 1 to 2 hours the first day. You can make an ice pack by wrapping a plastic bag of ice cubes in a thin towel. As the ice melts, be careful that the cast or splint doesn t get wet. You can place the ice pack inside the sling and directly over the splint or cast. Continue to use the ice pack 3 to 4 times a day for the next 2 days. Then use the ice pack as needed to ease pain and swelling.    Keep the splint or cast completely dry at all times. Bathe with your splint or cast out of the water. Protect it with a large plastic bag, rubber-banded at the top end. If a fiberglass splint or cast gets wet, you can dry it with a hair dryer.    You may use acetaminophen or ibuprofen to control pain, unless another pain medicine was prescribed. If you have chronic liver or kidney disease, talk with your healthcare provider before using these medicines. Also talk with your provider if you ve had a stomach ulcer or gastrointestinal bleeding.    Don t put creams or objects under the cast if you have itching.  Follow-up care  Follow up with your healthcare provider in 1 week, or as  advised. This is to make sure the bone is healing the way it should. If a splint was put on, it may be changed to a cast during your follow-up visit.  X-rays may be taken. You will be told of any new findings that may affect your care.  When to seek medical advice  Call your healthcare provider right away if any of these occur:    The cast or splint cracks    The plaster cast or splint becomes wet or soft    The fiberglass cast or splint stays wet for more than 24 hours    Tightness or pain under the cast or splint gets worse    Bad odor from the cast or wound fluid stains the cast    Fingers become swollen, cold, blue, numb, or tingly    You can t move your fingers    Skin around cast becomes red    Fever of 100.4 F (38 C) or higher, or as directed by your healthcare provider   Date Last Reviewed: 2/6/2017 2000-2017 [employer]. [address] [place] [address], [place] [id number]. All rights reserved. This information is not intended as a substitute for professional medical care. Always follow your healthcare professional's instructions.

## 2017-07-07 NOTE — MR AVS SNAPSHOT
After Visit Summary   7/7/2017    Denilson Sweet    MRN: [id number]           Patient Information     Date Of Birth          1970        Visit Information        Provider Department      7/7/2017 1:20 PM Lisa Ennis PA-C [facility]        Today's Diagnoses     Right elbow pain    -  1    Encounter for surveillance of contraceptive pills        Closed displaced fracture of head of right radius, initial encounter          Care Instructions    Take Ibuprofen 600 mg every 6 hours as needed  for pain  Avoid excessive ROM and no lifting heavy items until cleared by orthopedist.       At [facility], we strive to deliver an exceptional experience to you, every time we see you.    If you receive a survey in the mail, please send us back your thoughts. We really do value your feedback.    Thank you for visiting [facility]    Normal or non-critical lab and imaging results will be communicated to you by MyChart, letter or phone within 7 days.  If you do not hear from us within 10 days, please call the clinic. If you have a critical or abnormal lab result, we will notify you by phone as soon as possible.     If you have any questions regarding your visit please contact:     Team [facility]ore[facility]Spirit  Clinic Hours Telephone Number   Dr. Juan العراقي   7am-7pm  Monday through Thursday  7am-5pm Friday [phone]  Kamilah SCHAFFER RN   Pharmacy 8:30am-9pm Monday-Friday    9am-5pm Saturday-Sunday [phone]   Urgent Care 11am-9pm Monday-Friday        9am-5pm Saturday-Sunday [phone]     After hours, weekend or if you need to make an appointment with your primary provider please call [phone].   After Hours nurse advise: call [facility]: [phone]    Use [employer] (secure email communication and access to your  chart) to send your primary care provider a message or make an appointment. Ask someone on your Team how to sign up for [employer]. To log on to [employer] or for more information in [employer] please visit the website at [email or web address].   As of October 8, 2013, all password changes, disabled accounts, or ID changes in [employer]/MyHealth will be done by our Access Services Department.   If you need help with your account or password, call: [phone]. Clinic staff no longer has the ability to change passwords.     Elbow Fracture    You have a break (fracture) of one or more bones of your elbow joint. This may be a small crack in the bone. Or it may be a major break, with the broken parts pushed out of position.  This fracture usually takes 4 to 12 weeks to heal, depending on the type. The first step in treatment is with a splint or cast. Severe fractures may need surgery to put the bone fragments back into place.  Home care  Follow these guidelines when caring for yourself at home:    Keep your arm elevated to reduce pain and swelling. When sitting or lying down keep your arm above the level of your heart. You can do this by placing your arm on a pillow that rests on your chest or on a pillow at your side. This is most important during the first 2 days (48 hours) after the injury.    Put an ice pack on the injured area. Do this for 20 minutes every 1 to 2 hours the first day. You can make an ice pack by wrapping a plastic bag of ice cubes in a thin towel. As the ice melts, be careful that the cast or splint doesn t get wet. You can place the ice pack inside the sling and directly over the splint or cast. Continue to use the ice pack 3 to 4 times a day for the next 2 days. Then use the ice pack as needed to ease pain and swelling.    Keep the splint or cast completely dry at all times. Bathe with your splint or cast out of the water. Protect it with a large plastic bag, rubber-banded at the top end. If a  fiberglass splint or cast gets wet, you can dry it with a hair dryer.    You may use acetaminophen or ibuprofen to control pain, unless another pain medicine was prescribed. If you have chronic liver or kidney disease, talk with your healthcare provider before using these medicines. Also talk with your provider if you ve had a stomach ulcer or gastrointestinal bleeding.    Don t put creams or objects under the cast if you have itching.  Follow-up care  Follow up with your healthcare provider in 1 week, or as advised. This is to make sure the bone is healing the way it should. If a splint was put on, it may be changed to a cast during your follow-up visit.  X-rays may be taken. You will be told of any new findings that may affect your care.  When to seek medical advice  Call your healthcare provider right away if any of these occur:    The cast or splint cracks    The plaster cast or splint becomes wet or soft    The fiberglass cast or splint stays wet for more than 24 hours    Tightness or pain under the cast or splint gets worse    Bad odor from the cast or wound fluid stains the cast    Fingers become swollen, cold, blue, numb, or tingly    You can t move your fingers    Skin around cast becomes red    Fever of 100.4 F (38 C) or higher, or as directed by your healthcare provider   Date Last Reviewed: 2/6/2017 2000-2017 [employer]. [address] [place] [address]. All rights reserved. This information is not intended as a substitute for professional medical care. Always follow your healthcare professional's instructions.                Follow-ups after your visit        Additional Services     ORTHO  REFERRAL       [facility] is referring you to the Orthopedic  Services at [facility].       The  Representative will assist you in the coordination of your Orthopedic and Musculoskeletal Care as prescribed by your  physician.    The [facility] Representative will call you within 1 business day to help schedule your appointment, or you may contact the [facility] Representative at:    All areas ~ [phone]     Type of Referral : Surgical / Specialist       Timeframe requested: 1 - 2 days    Coverage of these services is subject to the terms and limitations of your health insurance plan.  Please call member services at your health plan with any benefit or coverage questions.      If X-rays, CT or MRI's have been performed, please contact the facility where they were done to arrange for , prior to your scheduled appointment.  Please bring this referral request to your appointment and present it to your specialist.                  Who to contact     If you have questions or need follow up information about today's clinic visit or your schedule please contact [facility] directly at [phone].  Normal or non-critical lab and imaging results will be communicated to you by [employer], letter or phone within 4 business days after the clinic has received the results. If you do not hear from us within 7 days, please contact the clinic through [employer] or phone. If you have a critical or abnormal lab result, we will notify you by phone as soon as possible.  Submit refill requests through [employer] or call your pharmacy and they will forward the refill request to us. Please allow 3 business days for your refill to be completed.          Additional Information About Your Visit        [employer] Information     [employer] gives you secure access to your electronic health record. If you see a primary care provider, you can also send messages to your care team and make appointments. If you have questions, please call your primary care clinic.  If you do not have a primary care provider, please call [phone] and they will assist you.        Care EveryWhere ID     This is your Care EveryWhere ID. This could be used by  "other organizations to access your [facility] medical records  [id number]        Your Vitals Were     Pulse Temperature Height Pulse Oximetry Breastfeeding? BMI (Body Mass Index)    90 96.7  F (35.9  C) (Oral) 5' 5\" (1.651 m) 99% No 22.13 kg/m2       Blood Pressure from Last 3 Encounters:   07/07/17 121/69   06/01/17 116/69   04/04/17 119/56    Weight from Last 3 Encounters:   07/07/17 133 lb (60.3 kg)   06/01/17 133 lb (60.3 kg)   04/04/17 127 lb 2 oz (57.7 kg)              We Performed the Following     ORTHO  REFERRAL        Primary Care Provider Office Phone # Fax #    Lisa Ennis PA-C [phone]       [facility] [address]  [facility] [address]        Equal Access to Services     MARYJANE SHEPHERD : Hadii aad ku hadasho Soomaali, waaxda luqadaha, qaybta kaalmada adeegyada, waxay idiin hayaan ruddy marquis . So [facility] [phone].    ATENCIÓN: Si liu damico, tiene a colon disposición servicios gratuitos de asistencia lingüística. Llame al [phone].    We comply with applicable federal civil rights laws and [place] laws. We do not discriminate on the basis of race, color, national origin, age, disability sex, sexual orientation or gender identity.            Thank you!     Thank you for choosing [facility]  for your care. Our goal is always to provide you with excellent care. Hearing back from our patients is one way we can continue to improve our services. Please take a few minutes to complete the written survey that you may receive in the mail after your visit with us. Thank you!             Your Updated Medication List - Protect others around you: Learn how to safely use, store and throw away your medicines at www.disposemymeds.org.          This list is accurate as of: 7/7/17  2:22 PM.  Always use your most recent med list.                   Brand Name Dispense Instructions for use Diagnosis    AMBIEN 5 MG tablet "   Generic drug:  zolpidem      Take by mouth nightly as needed for sleep        CALCIUM 600 + D PO      None Entered        cetirizine 10 MG tablet    zyrTEC    30 tablet    Take 1 tablet (10 mg) by mouth every evening    Eczema, unspecified type       clobetasol propionate 0.05 % Foam     50 g    Apply sparingly to affected area twice daily as needed.  Do not apply to face.    Eczema, unspecified type       FISH OIL PO      Take  by mouth. Pt unsure dose        levonorgestrel-ethinyl estradiol 0.15-0.03 MG per tablet    SEASONALE    91 tablet    Take 1 tablet by mouth daily    Encounter for surveillance of contraceptive pills       LORazepam 0.5 MG tablet    ATIVAN     Take 1 tablet by mouth 2 times daily as needed.        methocarbamol 750 MG tablet    ROBAXIN    30 tablet    Take 1 tablet (750 mg) by mouth 3 times daily as needed for muscle spasms    Hip flexor tendinitis, left       MULTI-VITAMIN PO      Take  by mouth.        naproxen 500 MG tablet    NAPROSYN    60 tablet    Take 1 tablet (500 mg) by mouth 2 times daily (with meals)    Hip flexor tendinitis, left       OLANZapine 20 MG tablet    zyPREXA     Take 2 tablets by mouth At Bedtime.        traZODone 100 MG tablet    DESYREL     TAKE 1 TABLET BY MOUTH DAILY AT BEDTIME.        * triamcinolone 0.1 % cream    KENALOG    30 g    Apply sparingly to affected area three times daily for 14 days.    Eczema, unspecified type       * triamcinolone 0.025 % cream    KENALOG    45 g    Apply topically 2 times daily Thin layer to face, use 2 weeks on, 1 week off    Eyelid dermatitis, eczematous, unspecified laterality       * Notice:  This list has 2 medication(s) that are the same as other medications prescribed for you. Read the directions carefully, and ask your doctor or other care provider to review them with you.
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
## 2017-07-10 ENCOUNTER — OFFICE VISIT (OUTPATIENT)
Dept: ORTHOPEDICS | Facility: CLINIC | Age: 47
End: 2017-07-10
Payer: COMMERCIAL

## 2017-07-10 VITALS
BODY MASS INDEX: 22.16 KG/M2 | SYSTOLIC BLOOD PRESSURE: 112 MMHG | RESPIRATION RATE: 16 BRPM | HEART RATE: 59 BPM | WEIGHT: 133 LBS | DIASTOLIC BLOOD PRESSURE: 75 MMHG | OXYGEN SATURATION: 97 % | HEIGHT: 65 IN

## 2017-07-10 DIAGNOSIS — S52.121A CLOSED FRACTURE OF HEAD OF RIGHT RADIUS, INITIAL ENCOUNTER: Primary | ICD-10-CM

## 2017-07-10 PROCEDURE — 99243 OFF/OP CNSLTJ NEW/EST LOW 30: CPT | Performed by: ORTHOPAEDIC SURGERY

## 2017-07-10 ASSESSMENT — PAIN SCALES - GENERAL: PAINLEVEL: MODERATE PAIN (5)
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
## 2017-10-12 ENCOUNTER — RADIANT APPOINTMENT (OUTPATIENT)
Dept: GENERAL RADIOLOGY | Facility: CLINIC | Age: 47
End: 2017-10-12
Attending: ORTHOPAEDIC SURGERY
Payer: COMMERCIAL

## 2017-10-12 ENCOUNTER — OFFICE VISIT (OUTPATIENT)
Dept: ORTHOPEDICS | Facility: CLINIC | Age: 47
End: 2017-10-12
Payer: COMMERCIAL

## 2017-10-12 VITALS — BODY MASS INDEX: 22.42 KG/M2 | WEIGHT: 134.6 LBS | HEIGHT: 65 IN | RESPIRATION RATE: 16 BRPM

## 2017-10-12 DIAGNOSIS — S52.121A CLOSED DISPLACED FRACTURE OF HEAD OF RIGHT RADIUS, INITIAL ENCOUNTER: ICD-10-CM

## 2017-10-12 DIAGNOSIS — S52.121S CLOSED DISPLACED FRACTURE OF HEAD OF RIGHT RADIUS, SEQUELA: Primary | ICD-10-CM

## 2017-10-12 PROCEDURE — 73080 X-RAY EXAM OF ELBOW: CPT | Mod: RT

## 2017-10-12 PROCEDURE — 99212 OFFICE O/P EST SF 10 MIN: CPT | Performed by: ORTHOPAEDIC SURGERY

## 2017-10-12 ASSESSMENT — PAIN SCALES - GENERAL: PAINLEVEL: MILD PAIN (2)
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
## 2017-10-12 NOTE — NURSING NOTE
"Chief Complaint   Patient presents with     RECHECK     Right radial head fx. DOI 5/31/17, 5 month PO. Patient states her elbow is doing much better, still a little pain. She just wants to make sure it is healing correctly. Pain with certain motions or lifting certain weights. Her elbow will crack every once in a while.        Initial Resp 16  Ht 1.651 m (5' 5\")  Wt 61.1 kg (134 lb 9.6 oz)  BMI 22.4 kg/m2 Estimated body mass index is 22.4 kg/(m^2) as calculated from the following:    Height as of this encounter: 1.651 m (5' 5\").    Weight as of this encounter: 61.1 kg (134 lb 9.6 oz).  Medication Reconciliation: talya Arteaga Certified Medical Assistant    "
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
## 2017-10-12 NOTE — MR AVS SNAPSHOT
After Visit Summary   10/12/2017    Denilson Sweet    MRN: [id number]           Patient Information     Date Of Birth          1970        Visit Information        Provider Department      10/12/2017 3:15 PM Wei Matta MD [facility]        Today's Diagnoses     Closed displaced fracture of head of right radius, sequela    -  1      Care Instructions    Please remember to call and schedule a follow up appointment if one was recommended at your earliest convenience.  Orthopedics CLINIC HOURS TELEPHONE NUMBER   Dr. Sigrid Marquez  Certified Medical Assistant   Monday & Wednesday   8am - 5pm  Thursday 1pm - 5pm  Friday 8am -11:30am Specialty schedulers:   [phone] to schedule your surgery.  Main Clinic:   [phone] to make an appointment with any provider.    Urgent Care locations:    [facility] Monday-Friday Closed  Saturday-Sunday 9am-5pm      Monday-Friday 12pm - 8pm  Saturday-Sunday 9am-5pm [phone]     If SURGERY has been recommended, please call our Specialty Schedulers at [phone] to schedule your procedure.    If you need a medication refill, please contact your pharmacy. Please allow 3 business days for your refill to be completed.    If an MRI or CT scan has been recommended, please call [facility] Schedulers at [phone] to schedule your appointment.  Use LoopItt (secure e-mail communication and access to your chart) to send a message or to make an appointment. Please ask how you can sign up for ByteShield.  Your care team's suggested websites for health information:   [email or web address] : Up to date and easily searchable information on multiple topics.   [email or web address] : [place] dept of heat, public health issues in [place], N1N1              Follow-ups after your visit        Follow-up notes from your care team     Return if symptoms worsen or fail to improve.     "  Who to contact     If you have questions or need follow up information about today's clinic visit or your schedule please contact [facility] directly at [phone].  Normal or non-critical lab and imaging results will be communicated to you by MyChart, letter or phone within 4 business days after the clinic has received the results. If you do not hear from us within 7 days, please contact the clinic through MyChart or phone. If you have a critical or abnormal lab result, we will notify you by phone as soon as possible.  Submit refill requests through Caustic Graphics or call your pharmacy and they will forward the refill request to us. Please allow 3 business days for your refill to be completed.          Additional Information About Your Visit        Querylyhart Information     Ca[employer]ic Graphics gives you secure access to your electronic health record. If you see a primary care provider, you can also send messages to your care team and make appointments. If you have questions, please call your primary care clinic.  If you do not have a primary care provider, please call [phone] and they will assist you.        Care EveryWhere ID     This is your Care EveryWhere ID. This could be used by other organizations to access your [facility] medical records  [id number]        Your Vitals Were     Respirations Height BMI (Body Mass Index)             16 5' 5\" (1.651 m) 22.4 kg/m2          Blood Pressure from Last 3 Encounters:   07/10/17 112/75   07/07/17 121/69   06/01/17 116/69    Weight from Last 3 Encounters:   10/12/17 134 lb 9.6 oz (61.1 kg)   07/10/17 133 lb (60.3 kg)   07/07/17 133 lb (60.3 kg)               Primary Care Provider Office Phone # Fax #    Lisa Ennis PA-C [phone]       [address]  [facility] [address]        Equal Access to Services     MARYJANE SHEPHERD AH: Hadii aad ku hadasho Soomaali, waaxda luqadaha, qaybta kaalmada jennifer hernándezin " dylan parveenjuan f genarokev marquis ah. So [facility] [phone].    ATENCIÓN: Si liu damico, tiene a colon disposición servicios gratuitos de asistencia lingüística. Alma gipson [phone].    We comply with applicable federal civil rights laws and [place] laws. We do not discriminate on the basis of race, color, national origin, age, disability, sex, sexual orientation, or gender identity.            Thank you!     Thank you for choosing [facility]  for your care. Our goal is always to provide you with excellent care. Hearing back from our patients is one way we can continue to improve our services. Please take a few minutes to complete the written survey that you may receive in the mail after your visit with us. Thank you!             Your Updated Medication List - Protect others around you: Learn how to safely use, store and throw away your medicines at www.disposemymeds.org.          This list is accurate as of: 10/12/17  4:03 PM.  Always use your most recent med list.                   Brand Name Dispense Instructions for use Diagnosis    AMBIEN 5 MG tablet   Generic drug:  zolpidem      Take by mouth nightly as needed for sleep        CALCIUM 600 + D PO      None Entered        cetirizine 10 MG tablet    zyrTEC    30 tablet    Take 1 tablet (10 mg) by mouth every evening    Eczema, unspecified type       clobetasol propionate 0.05 % Foam     50 g    Apply sparingly to affected area twice daily as needed.  Do not apply to face.    Eczema, unspecified type       FISH OIL PO      Take  by mouth. Pt unsure dose        levonorgestrel-ethinyl estradiol 0.15-0.03 MG per tablet    SEASONALE    91 tablet    Take 1 tablet by mouth daily    Encounter for surveillance of contraceptive pills       LORazepam 0.5 MG tablet    ATIVAN     Take 1 tablet by mouth 2 times daily as needed.        methocarbamol 750 MG tablet    ROBAXIN    30 tablet    Take 1 tablet (750 mg) by mouth 3 times daily as needed for  muscle spasms    Hip flexor tendinitis, left       MULTI-VITAMIN PO      Take  by mouth.        naproxen 500 MG tablet    NAPROSYN    60 tablet    Take 1 tablet (500 mg) by mouth 2 times daily (with meals)    Hip flexor tendinitis, left       OLANZapine 20 MG tablet    zyPREXA     Take 2 tablets by mouth At Bedtime.        traZODone 100 MG tablet    DESYREL     TAKE 1 TABLET BY MOUTH DAILY AT BEDTIME.        * triamcinolone 0.1 % cream    KENALOG    30 g    Apply sparingly to affected area three times daily for 14 days.    Eczema, unspecified type       * triamcinolone 0.025 % cream    KENALOG    45 g    Apply topically 2 times daily Thin layer to face, use 2 weeks on, 1 week off    Eyelid dermatitis, eczematous, unspecified laterality       * Notice:  This list has 2 medication(s) that are the same as other medications prescribed for you. Read the directions carefully, and ask your doctor or other care provider to review them with you.

## 2017-12-28 ENCOUNTER — TELEPHONE (OUTPATIENT)
Dept: FAMILY MEDICINE | Facility: CLINIC | Age: 47
End: 2017-12-28

## 2017-12-28 NOTE — TELEPHONE ENCOUNTER
Panel Management Review      BP Readings from Last 1 Encounters:   07/10/17 112/75    , No results found for: A1C, 7/7/2017  Last Office Visit with this department: 7/7/2017    Fail List measure: PAP      Patient is due/failing the following:   PAP    Action needed:   Cervical cancer screening    Type of outreach:    Sent letter.    Questions for provider review:    None                                                                                                                                    Trisha Fuchs MA      Chart routed to  .

## 2017-12-28 NOTE — LETTER
December 28, 2017      Denilson Sweet  [address]  [facility] [address]          Dear Denilson Sweet,     At [facility] we care about your health and are committed to providing quality patient care.    Which includes staying current on preventive cancer screenings.  You can increase your chances of finding and treating cancers through regular screenings.      Our records indicate you may be due for the following preventive screening(s):    Cervical Cancer Screening    Pap smear is a screening test used to detect cervical cancer. It is recommended every three years for women 21 and older. The test should be done at least once every three years but women who are at greater risk for cervical cancer may need to have the test more often.    To schedule an appointment or discuss this screening further, you may contact us by phone at the [facility] at [phone] or online through the patient portal/Findline @ [email or web address]    If you have had any of the screenings listed above at another facility, please call us so that we may update your chart.      Your partners in health,      Quality Committee at [facility]/CHEKO

## 2017-12-30 ENCOUNTER — HEALTH MAINTENANCE LETTER (OUTPATIENT)
Age: 47
End: 2017-12-30

## 2018-01-25 ENCOUNTER — OFFICE VISIT (OUTPATIENT)
Dept: FAMILY MEDICINE | Facility: CLINIC | Age: 48
End: 2018-01-25
Payer: COMMERCIAL

## 2018-01-25 VITALS
OXYGEN SATURATION: 98 % | WEIGHT: 134.9 LBS | HEART RATE: 80 BPM | HEIGHT: 65 IN | BODY MASS INDEX: 22.48 KG/M2 | DIASTOLIC BLOOD PRESSURE: 66 MMHG | TEMPERATURE: 97.7 F | SYSTOLIC BLOOD PRESSURE: 112 MMHG

## 2018-01-25 DIAGNOSIS — N95.1 PERIMENOPAUSAL: ICD-10-CM

## 2018-01-25 DIAGNOSIS — B35.1 TOENAIL FUNGUS: ICD-10-CM

## 2018-01-25 DIAGNOSIS — L90.5 SCAR: ICD-10-CM

## 2018-01-25 DIAGNOSIS — L84 CORN OR CALLUS: ICD-10-CM

## 2018-01-25 DIAGNOSIS — K64.4 EXTERNAL HEMORRHOIDS: ICD-10-CM

## 2018-01-25 DIAGNOSIS — Z12.4 SCREENING FOR MALIGNANT NEOPLASM OF CERVIX: ICD-10-CM

## 2018-01-25 DIAGNOSIS — Z00.00 ROUTINE HISTORY AND PHYSICAL EXAMINATION OF ADULT: Primary | ICD-10-CM

## 2018-01-25 LAB
FSH SERPL-ACNC: 11.4 IU/L
HCG SERPL QL: NEGATIVE
TSH SERPL DL<=0.005 MIU/L-ACNC: 0.86 MU/L (ref 0.4–4)

## 2018-01-25 PROCEDURE — 99213 OFFICE O/P EST LOW 20 MIN: CPT | Mod: 25 | Performed by: PHYSICIAN ASSISTANT

## 2018-01-25 PROCEDURE — 11056 PARNG/CUTG B9 HYPRKR LES 2-4: CPT | Performed by: PHYSICIAN ASSISTANT

## 2018-01-25 PROCEDURE — 99396 PREV VISIT EST AGE 40-64: CPT | Mod: 25 | Performed by: PHYSICIAN ASSISTANT

## 2018-01-25 PROCEDURE — 83001 ASSAY OF GONADOTROPIN (FSH): CPT | Performed by: PHYSICIAN ASSISTANT

## 2018-01-25 PROCEDURE — 36415 COLL VENOUS BLD VENIPUNCTURE: CPT | Performed by: PHYSICIAN ASSISTANT

## 2018-01-25 PROCEDURE — 84703 CHORIONIC GONADOTROPIN ASSAY: CPT | Performed by: PHYSICIAN ASSISTANT

## 2018-01-25 PROCEDURE — 84443 ASSAY THYROID STIM HORMONE: CPT | Performed by: PHYSICIAN ASSISTANT

## 2018-01-25 PROCEDURE — G0145 SCR C/V CYTO,THINLAYER,RESCR: HCPCS | Performed by: PHYSICIAN ASSISTANT

## 2018-01-25 PROCEDURE — 87624 HPV HI-RISK TYP POOLED RSLT: CPT | Performed by: PHYSICIAN ASSISTANT

## 2018-01-25 NOTE — Clinical Note
Krysta,  can you please review if I can do additional [phone] in this case  Thank you  Benita Ennis PAC

## 2018-01-25 NOTE — MR AVS SNAPSHOT
After Visit Summary   1/25/2018    Denilson Sweet    MRN: [id number]           Patient Information     Date Of Birth          1970        Visit Information        Provider Department      1/25/2018 2:20 PM Lisa Ennis PA-C [facility]        Today's Diagnoses     Routine history and physical examination of adult    -  1    Screening for malignant neoplasm of cervix        Perimenopausal        Scar        Toenail fungus        External hemorrhoids        Corn or callus          Care Instructions      Preventive Health Recommendations  Female Ages 40 to 49    Yearly exam:     See your health care provider every year in order to  1. Review health changes.   2. Discuss preventive care.    3. Review your medicines if your doctor prescribed any.      Get a Pap test every three years (unless you have an abnormal result and your provider advises testing more often).      If you get Pap tests with HPV test, you only need to test every 5 years, unless you have an abnormal result. You do not need a Pap test if your uterus was removed (hysterectomy) and you have not had cancer.      You should be tested each year for STDs (sexually transmitted diseases), if you're at risk.       Ask your doctor if you should have a mammogram.      Have a colonoscopy (test for colon cancer) if someone in your family has had colon cancer or polyps before age 50.       Have a cholesterol test every 5 years.       Have a diabetes test (fasting glucose) after age 45. If you are at risk for diabetes, you should have this test every 3 years.    Shots: Get a flu shot each year. Get a tetanus shot every 10 years.     Nutrition:     Eat at least 5 servings of fruits and vegetables each day.    Eat whole-grain bread, whole-wheat pasta and brown rice instead of white grains and rice.    Talk to your provider about Calcium and Vitamin D.     Lifestyle    Exercise at least 150 minutes a week (an  average of 30 minutes a day, 5 days a week). This will help you control your weight and prevent disease.    Limit alcohol to one drink per day.    No smoking.     Wear sunscreen to prevent skin cancer.    See your dentist every six months for an exam and cleaning.          Follow-ups after your visit        Additional Services     COLORECTAL SURGERY REFERRAL       Your provider has referred you to: [facility]: Colon and Rectal Surgery Clinic [facility] [phone]   [email or web address]    Referral Reason(s): Hemorrhoids  Special Concerns: None  This referral is: Elective (week +)  It is OK to leave a message on patient's voicemail.    Please be aware that coverage of these services is subject to the terms and limitations of your health insurance plan.  Call member services at your health plan with any benefit or coverage questions.      Please bring the following with you to your appointment:    (1) Any X-Rays, CTs or MRIs which have been performed.  Contact the facility where they were done to arrange for  prior to your scheduled appointment.    (2) List of current medications  (3) This referral request   (4) Any documents/labs given to you for this referral                  Who to contact     If you have questions or need follow up information about today's clinic visit or your schedule please contact [facility] directly at [phone].  Normal or non-critical lab and imaging results will be communicated to you by MyChart, letter or phone within 4 business days after the clinic has received the results. If you do not hear from us within 7 days, please contact the clinic through MyChart or phone. If you have a critical or abnormal lab result, we will notify you by phone as soon as possible.  Submit refill requests through MobileAware or call your pharmacy and they will forward the refill request to us. Please allow 3 business days for your  "refill to be completed.          Additional Information About Your Visit        Platogohart Information     Kanshu gives you secure access to your electronic health record. If you see a primary care provider, you can also send messages to your care team and make appointments. If you have questions, please call your primary care clinic.  If you do not have a primary care provider, please call [phone] and they will assist you.        Care EveryWhere ID     This is your Care EveryWhere ID. This could be used by other organizations to access your [facility] medical records  [id number]        Your Vitals Were     Pulse Temperature Height Last Period Pulse Oximetry BMI (Body Mass Index)    80 97.7  F (36.5  C) (Oral) 5' 5.35\" (1.66 m) 07/10/2017 (Exact Date) 98% 22.21 kg/m2       Blood Pressure from Last 3 Encounters:   01/25/18 112/66   07/10/17 112/75   07/07/17 121/69    Weight from Last 3 Encounters:   01/25/18 134 lb 14.4 oz (61.2 kg)   10/12/17 134 lb 9.6 oz (61.1 kg)   07/10/17 133 lb (60.3 kg)              We Performed the Following     COLORECTAL SURGERY REFERRAL     Follicle stimulating hormone     HPV High Risk Types DNA Cervical     Pap imaged thin layer screen with HPV - recommended age 30 - 65 years (select HPV order below)     TRIM HYPERKERATOTIC SKIN LESION,2-4          Today's Medication Changes          These changes are accurate as of 1/25/18  3:27 PM.  If you have any questions, ask your nurse or doctor.               Start taking these medicines.        Dose/Directions    terbinafine 1 % Gel   Commonly known as:  LAMISIL ADVANCED   Used for:  Toenail fungus   Started by:  Lisa Ennis PA-C        Dose:  0.25 inch   Apply 0.25 inches topically 2 times daily   Quantity:  30 g   Refills:  11            Where to get your medicines      These medications were sent to [facility]/pharmacy #[id number] - [facility], [place] - [address]  [address] [facility] [address]     Phone:  " [phone]     terbinafine 1 % Gel                Primary Care Provider Office Phone # Fax #    Lisa Ennis PA-C [phone] [address]  [facility] [address]        Equal Access to Services     KELLEY SHEPHERD : Hadii aad ku hadasho Soomaali, waaxda luqadaha, qaybta kaalmada adeegyada, waxay idiin hayaan adeeg khlandonarmin laluisito hinton. So [facility] [phone].    ATENCIÓN: Si habla español, tiene a colon disposición servicios gratuitos de asistencia lingüística. Alma al [phone].    We comply with applicable federal civil rights laws and [place] laws. We do not discriminate on the basis of race, color, national origin, age, disability, sex, sexual orientation, or gender identity.            Thank you!     Thank you for choosing [facility]  for your care. Our goal is always to provide you with excellent care. Hearing back from our patients is one way we can continue to improve our services. Please take a few minutes to complete the written survey that you may receive in the mail after your visit with us. Thank you!             Your Updated Medication List - Protect others around you: Learn how to safely use, store and throw away your medicines at www.disposemymeds.org.          This list is accurate as of 1/25/18  3:27 PM.  Always use your most recent med list.                   Brand Name Dispense Instructions for use Diagnosis    AMBIEN 5 MG tablet   Generic drug:  zolpidem      Take by mouth nightly as needed for sleep        CALCIUM 600 + D PO      None Entered        cetirizine 10 MG tablet    zyrTEC    30 tablet    Take 1 tablet (10 mg) by mouth every evening    Eczema, unspecified type       FISH OIL PO      Take  by mouth. Pt unsure dose        LORazepam 0.5 MG tablet    ATIVAN     Take 1 tablet by mouth 2 times daily as needed.        MULTI-VITAMIN PO      Take  by mouth.        naproxen 500 MG tablet    NAPROSYN    60 tablet    Take 1 tablet (500 mg)  by mouth 2 times daily (with meals)    Hip flexor tendinitis, left       OLANZapine 20 MG tablet    zyPREXA     Take 2 tablets by mouth At Bedtime.        terbinafine 1 % Gel    LAMISIL ADVANCED    30 g    Apply 0.25 inches topically 2 times daily    Toenail fungus       traZODone 100 MG tablet    DESYREL     TAKE 1 TABLET BY MOUTH DAILY AT BEDTIME.

## 2018-01-30 LAB
COPATH REPORT: NORMAL
PAP: NORMAL

## 2018-01-31 LAB
FINAL DIAGNOSIS: NORMAL
HPV HR 12 DNA CVX QL NAA+PROBE: NEGATIVE
HPV16 DNA SPEC QL NAA+PROBE: NEGATIVE
HPV18 DNA SPEC QL NAA+PROBE: NEGATIVE
SPECIMEN DESCRIPTION: NORMAL
SPECIMEN SOURCE CVX/VAG CYTO: NORMAL

## 2018-02-01 NOTE — TELEPHONE ENCOUNTER
APPT INFO    Date /Time: 2/7/18   Reason for Appt: Hemorrhoids   Ref Provider/Clinic: [facility]   Are there internal records? Yes/No?  IF YES, list clinic names: Yes  See Above   Are there outside records? Yes/No? No   Patient Contact (Y/N) & Call Details: No referred   Action: Reviewed records; Records are in EPIC     OUTSIDE RECORDS CHECKLIST     CLINIC NAME COMMENTS REC (x) IMG (x)

## 2018-02-04 ASSESSMENT — ENCOUNTER SYMPTOMS
BOWEL INCONTINENCE: 0
DECREASED CONCENTRATION: 1
HEARTBURN: 0
INSOMNIA: 0
DEPRESSION: 1
CONSTIPATION: 1
RECTAL PAIN: 1
JAUNDICE: 0
ABDOMINAL PAIN: 0
VOMITING: 0
NAUSEA: 0
BLOOD IN STOOL: 1
BLOATING: 0
DIARRHEA: 0
NERVOUS/ANXIOUS: 1
PANIC: 0

## 2018-02-06 ENCOUNTER — TELEPHONE (OUTPATIENT)
Dept: SURGERY | Facility: CLINIC | Age: 48
End: 2018-02-06

## 2018-02-06 NOTE — TELEPHONE ENCOUNTER
Lm for pt reminding her of her appt with Valerie Escalera NP on 2/7/18 @ 2:30pm. Left direct number for pt to call back if she needs to cx or r/s.   Shefali CLEARY LPN

## 2018-02-07 ENCOUNTER — OFFICE VISIT (OUTPATIENT)
Dept: SURGERY | Facility: CLINIC | Age: 48
End: 2018-02-07
Payer: COMMERCIAL

## 2018-02-07 ENCOUNTER — PRE VISIT (OUTPATIENT)
Dept: SURGERY | Facility: CLINIC | Age: 48
End: 2018-02-07

## 2018-02-07 VITALS
TEMPERATURE: 98.2 F | WEIGHT: 137.4 LBS | DIASTOLIC BLOOD PRESSURE: 69 MMHG | OXYGEN SATURATION: 98 % | HEART RATE: 83 BPM | BODY MASS INDEX: 22.62 KG/M2 | SYSTOLIC BLOOD PRESSURE: 108 MMHG

## 2018-02-07 DIAGNOSIS — K60.2 ANAL FISSURE: Primary | ICD-10-CM

## 2018-02-07 ASSESSMENT — PAIN SCALES - GENERAL: PAINLEVEL: MILD PAIN (2)

## 2018-02-07 NOTE — MR AVS SNAPSHOT
After Visit Summary   2/7/2018    Denilson Sweet    MRN: [id number]           Patient Information     Date Of Birth          1970        Visit Information        Provider Department      2/7/2018 2:30 PM Valerie Pulido APRN CNP [facility] Colon and Rectal Surgery        Today's Diagnoses     Anal fissure    -  1      Care Instructions    1. Diltiazem ointment 2% to be applied 3 times daily for 8 weeks  2. Fiber supplement such as Metamcuil, Citrucel, or Benefiber once to twice a day  3. MiraLax or Milk of Magnesia if still constipated  4. Drink 10 glasses of water a day  5. Tylenol, ibuprofen, and warm tub baths/sitz baths for pain  6. Follow up in 8 weeks. Follow up sooner if symptoms do not improve after 4 weeks.            Follow-ups after your visit        Your next 10 appointments already scheduled     Feb 07, 2018  2:30 PM CST   (Arrive by 2:15 PM)   New Patient Visit with CALIXTO Melendrez CNP [facility] Colon and Rectal Surgery ([facility] [place])    [address] [place] [id number] [phone]              Who to contact     Please call your clinic at [phone] to:    Ask questions about your health    Make or cancel appointments    Discuss your medicines    Learn about your test results    Speak to your doctor   If you have compliments or concerns about an experience at your clinic, or if you wish to file a complaint, please contact [facility] Patient Relations at [phone] or email us at [email or web address]         Additional Information About Your Visit        MyChart Information     Malauzai Softwarehart gives you secure access to your electronic health record. If you see a primary care provider, you can also send messages to your care team and make appointments. If you have questions, please call your primary care clinic.  If you do not have a primary care provider, please  call [phone] and they will assist you.      LegalGuru is an electronic gateway that provides easy, online access to your medical records. With LegalGuru, you can request a clinic appointment, read your test results, renew a prescription or communicate with your care team.     To access your existing account, please contact your [facility] or call [phone] for assistance.        Care EveryWhere ID     This is your Care EveryWhere ID. This could be used by other organizations to access your [facility] medical records  [id number]        Your Vitals Were     Pulse Temperature Pulse Oximetry BMI (Body Mass Index)          83 98.2  F (36.8  C) (Oral) 98% 22.62 kg/m2         Blood Pressure from Last 3 Encounters:   02/07/18 108/69   01/25/18 112/66   07/10/17 112/75    Weight from Last 3 Encounters:   02/07/18 137 lb 6.4 oz   01/25/18 134 lb 14.4 oz   10/12/17 134 lb 9.6 oz              Today, you had the following     No orders found for display         Today's Medication Changes          These changes are accurate as of 2/7/18  2:14 PM.  If you have any questions, ask your nurse or doctor.               Start taking these medicines.        Dose/Directions    diltiazem 2% in PLO cream (FV COMPOUNDED) 2% Gel   Used for:  Anal fissure   Started by:  Valerie Pulido APRN CNP        To anal opening three times daily.  Use a pea-sized amount.  Store at room temperature.   Quantity:  60 g   Refills:  0            Where to get your medicines      These medications were sent to [facility] - [place], [place] - [address]  [address] [facility] [address]     Phone:  [phone]     diltiazem 2% in PLO cream (FV COMPOUNDED) 2% Gel                Primary Care Provider Office Phone # Fax #    Lisa Ennis PA-C [phone]       [address]  [place] [facility] [address]        Equal Access to Services     MARYJANE SHEPHERD :  Hadii ronel whittaker hadkelseao Soasifali, waaxda luqadaha, qaybta kaalramy hernández, jennifer hinton. So [facility] [phone].    ATENCIÓN: Si beckala mookie, tiene a colon disposición servicios gratuitos de asistencia lingüística. Llame al [phone].    We comply with applicable federal civil rights laws and [place] laws. We do not discriminate on the basis of race, color, national origin, age, disability, sex, sexual orientation, or gender identity.            Thank you!     Thank you for choosing [facility]ON AND RECTAL SURGERY  for your care. Our goal is always to provide you with excellent care. Hearing back from our patients is one way we can continue to improve our services. Please take a few minutes to complete the written survey that you may receive in the mail after your visit with us. Thank you!             Your Updated Medication List - Protect others around you: Learn how to safely use, store and throw away your medicines at www.disposemymeds.org.          This list is accurate as of 2/7/18  2:14 PM.  Always use your most recent med list.                   Brand Name Dispense Instructions for use Diagnosis    AMBIEN 5 MG tablet   Generic drug:  zolpidem      Take by mouth nightly as needed for sleep        CALCIUM 600 + D PO      None Entered        diltiazem 2% in PLO cream (FV COMPOUNDED) 2% Gel     60 g    To anal opening three times daily.  Use a pea-sized amount.  Store at room temperature.    Anal fissure       FISH OIL PO      Take  by mouth. Pt unsure dose        LORazepam 0.5 MG tablet    ATIVAN     Take 1 tablet by mouth 2 times daily as needed.        MULTI-VITAMIN PO      Take  by mouth.        OLANZapine 20 MG tablet    zyPREXA     Take 2 tablets by mouth At Bedtime.        traZODone 100 MG tablet    DESYREL     TAKE 1 TABLET BY MOUTH DAILY AT BEDTIME.

## 2018-02-07 NOTE — NURSING NOTE
Chief Complaint   Patient presents with     Clinic Care Coordination - Initial     New pt consult, external hemorrhoid.        Vitals:    02/07/18 1359   BP: 108/69   BP Location: Left arm   Patient Position: Chair   Cuff Size: Adult Regular   Pulse: 83   Temp: 98.2  F (36.8  C)   TempSrc: Oral   SpO2: 98%   Weight: 137 lb 6.4 oz       Body mass index is 22.62 kg/(m^2).    Shefali CLEARY LPN

## 2018-02-07 NOTE — PROGRESS NOTES
Colon and Rectal Surgery Consult Clinic Note    Date: 2018     Referring provider:  Lisa Ennis PA-C  [address] [place] [address]     RE: Denilson Sweet  : 1970  NEGIN: 2018    Denilson Sweet is a very pleasant 47 year old female without a significant past medical history with a recent diagnosis of external hemorrhoids.  Given these findings they were subsequently sent to the [facility] for an opinion on this and a new patient consultation.     Denilson reports having bright red blood with bowel movements for many years.  This is only a small amount and mostly when she wipes.  She denies any blood mixed with her stools.  However, this occurs with almost every bowel movement.  She additionally has some pain with bowel movements which has also been ongoing for many years.  Her bowel movements are typically every other day but are sometimes hard.  She denies any diarrhea.  She is not on any medications for her bowel movements but some of her other medications tend to make her constipated.  She reports having a flexible sigmoidoscopy over 10 years ago which was normal.  She has a family history of her paternal grandmother with colon cancer diagnosed in her 50s.  She has never had any anorectal procedures in the past.  She is not on any blood thinners.    Assessment/Plan: 47 year old female with constipation and anal fissure.  On exam she has a right anterior anal fissure with associated skin tag.  Discussed that this is likely the source of her pain and bleeding. Discussed the importance of keeping stools soft and easy to pass while healing fissure.  Recommended starting a daily fiber supplement such as Citrucel or Metamucil and she can add in a laxative such as MiraLAX in addition as needed to keep stools soft.  Will treat with topical diltiazem with follow up in 8 weeks. Discussed that it may take several weeks for symptoms to improve. If no  improvement is noted after 4 weeks, return to clinic and discuss further intervention.  If bleeding persists despite healing of her fissure, would recommend a colonoscopy. Patient's questions were answered to her stated satisfaction and she is in agreement with this plan.    Medical history:  Past Medical History:   Diagnosis Date     Bipolar affective (H)      Genital herpes      NONSPECIFIC MEDICAL HISTORY     ENDOMETRIOSIS&ANDEOMYOSIS       Surgical history:  Past Surgical History:   Procedure Laterality Date     BIOPSY BREAST      RT     LAPAROSCOPY PROCEDURE UNLISTED      ABLATION       Problem list:    Patient Active Problem List    Diagnosis Date Noted     Closed displaced fracture of head of right radius, initial encounter 07/07/2017     Priority: Medium     Migraine 03/24/2016     Priority: Medium     Bipolar 1 disorder (H) 07/24/2013     Priority: Medium     CARDIOVASCULAR SCREENING; LDL GOAL LESS THAN 160 10/31/2010     Priority: Medium     Genital herpes 06/16/2010     Priority: Medium     IMO update changed this record. Please review for accuracy         Medications:  Current Outpatient Prescriptions   Medication Sig Dispense Refill     diltiazem 2% in PLO cream, FV COMPOUNDED, 2% GEL To anal opening three times daily.  Use a pea-sized amount.  Store at room temperature. 60 g 0     traZODone (DESYREL) 100 MG tablet TAKE 1 TABLET BY MOUTH DAILY AT BEDTIME.  3     zolpidem (AMBIEN) 5 MG tablet Take by mouth nightly as needed for sleep       LORazepam (ATIVAN) 0.5 MG tablet Take 1 tablet by mouth 2 times daily as needed.       OLANZapine (ZYPREXA) 20 MG tablet Take 2 tablets by mouth At Bedtime.       Omega-3 Fatty Acids (FISH OIL PO) Take  by mouth. Pt unsure dose       Multiple Vitamin (MULTI-VITAMIN PO) Take  by mouth.       CALCIUM 600 + D OR None Entered         Allergies:  No Known Allergies    Family history:  Family History   Problem Relation Age of Onset     OSTEOPOROSIS Mother      Cancer -  colorectal Paternal Grandmother        Social history:  Social History   Substance Use Topics     Smoking status: Never Smoker     Smokeless tobacco: Never Used     Alcohol use No    Marital status: .  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    Nursing Notes:   Archana Hess LPN  2/7/2018  2:01 PM  Signed  Chief Complaint   Patient presents with     Clinic Care Coordination - Initial     New pt consult, external hemorrhoid.        Vitals:    02/07/18 1359   BP: 108/69   BP Location: Left arm   Patient Position: Chair   Cuff Size: Adult Regular   Pulse: 83   Temp: 98.2  F (36.8  C)   TempSrc: Oral   SpO2: 98%   Weight: 137 lb 6.4 oz       Body mass index is 22.62 kg/(m^2).    Shefali CLEARY LPN                  Physical Examination:  /69 (BP Location: Left arm, Patient Position: Chair, Cuff Size: Adult Regular)  Pulse 83  Temp 98.2  F (36.8  C) (Oral)  Wt 137 lb 6.4 oz  SpO2 98%  BMI 22.62 kg/m2  General: alert, oriented, in no acute distress, sitting comfortably  HEENT: mucous membranes moist  Perianal external examination: Exam was chaperoned by Shefali Hess LPN  Perianal skin: Intact with no excoriation or lichenification.  Lesions: No evidence of an external lesion, nodularity, or induration in the perianal region.  Eversion of buttocks: There was evidence of an anal fissure. Details: left anterior anal fissure with skin tag present.  Skin tags or external hemorrhoids: Yes: skin tag in the left anterior position.  Digital rectal examination: Was deferred in order not to cause further trauma    Anoscopy: Was deferred in order not to cause further trauma    Total face to face time was 20 minutes, >50% counseling.    CALIXTO Mauricio, NP-C  Colon and Rectal Surgery   [facility]    This note was created using speech recognition software and may contain unintended word substitutions.

## 2018-02-07 NOTE — LETTER
2018      RE: Denilson Sweet  [address]  [place] [address]       Colon and Rectal Surgery Consult Clinic Note    Date: 2018     Referring provider:  Lisa Ennis PA-C  [address] [place] [address]     RE: Denilson Sweet  : 1970  NEGIN: 2018    Denilson Sweet is a very pleasant 47 year old female without a significant past medical history with a recent diagnosis of external hemorrhoids.  Given these findings they were subsequently sent to the Colon and Rectal Surgery Clinic for an opinion on this and a new patient consultation.     Denilson reports having bright red blood with bowel movements for many years.  This is only a small amount and mostly when she wipes.  She denies any blood mixed with her stools.  However, this occurs with almost every bowel movement.  She additionally has some pain with bowel movements which has also been ongoing for many years.  Her bowel movements are typically every other day but are sometimes hard.  She denies any diarrhea.  She is not on any medications for her bowel movements but some of her other medications tend to make her constipated.  She reports having a flexible sigmoidoscopy over 10 years ago which was normal.  She has a family history of her paternal grandmother with colon cancer diagnosed in her 50s.  She has never had any anorectal procedures in the past.  She is not on any blood thinners.    Assessment/Plan: 47 year old female with constipation and anal fissure.  On exam she has a right anterior anal fissure with associated skin tag.  Discussed that this is likely the source of her pain and bleeding. Discussed the importance of keeping stools soft and easy to pass while healing fissure.  Recommended starting a daily fiber supplement such as Citrucel or Metamucil and she can add in a laxative such as MiraLAX in addition as needed to keep stools soft.  Will treat with topical diltiazem with follow up in 8  weeks. Discussed that it may take several weeks for symptoms to improve. If no improvement is noted after 4 weeks, return to clinic and discuss further intervention.  If bleeding persists despite healing of her fissure, would recommend a colonoscopy. Patient's questions were answered to her stated satisfaction and she is in agreement with this plan.    Medical history:  Past Medical History:   Diagnosis Date     Bipolar affective (H)      Genital herpes      NONSPECIFIC MEDICAL HISTORY     ENDOMETRIOSIS&ANDEOMYOSIS       Surgical history:  Past Surgical History:   Procedure Laterality Date     BIOPSY BREAST      RT     LAPAROSCOPY PROCEDURE UNLISTED      ABLATION       Problem list:    Patient Active Problem List    Diagnosis Date Noted     Closed displaced fracture of head of right radius, initial encounter 07/07/2017     Priority: Medium     Migraine 03/24/2016     Priority: Medium     Bipolar 1 disorder (H) 07/24/2013     Priority: Medium     CARDIOVASCULAR SCREENING; LDL GOAL LESS THAN 160 10/31/2010     Priority: Medium     Genital herpes 06/16/2010     Priority: Medium     IMO update changed this record. Please review for accuracy         Medications:  Current Outpatient Prescriptions   Medication Sig Dispense Refill     diltiazem 2% in PLO cream, FV COMPOUNDED, 2% GEL To anal opening three times daily.  Use a pea-sized amount.  Store at room temperature. 60 g 0     traZODone (DESYREL) 100 MG tablet TAKE 1 TABLET BY MOUTH DAILY AT BEDTIME.  3     zolpidem (AMBIEN) 5 MG tablet Take by mouth nightly as needed for sleep       LORazepam (ATIVAN) 0.5 MG tablet Take 1 tablet by mouth 2 times daily as needed.       OLANZapine (ZYPREXA) 20 MG tablet Take 2 tablets by mouth At Bedtime.       Omega-3 Fatty Acids (FISH OIL PO) Take  by mouth. Pt unsure dose       Multiple Vitamin (MULTI-VITAMIN PO) Take  by mouth.       CALCIUM 600 + D OR None Entered         Allergies:  No Known Allergies    Family history:  Family  History   Problem Relation Age of Onset     OSTEOPOROSIS Mother      Cancer - colorectal Paternal Grandmother        Social history:  Social History   Substance Use Topics     Smoking status: Never Smoker     Smokeless tobacco: Never Used     Alcohol use No    Marital status: .  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    Nursing Notes:   Archana Hess LPN  2/7/2018  2:01 PM  Signed  Chief Complaint   Patient presents with     Clinic Care Coordination - Initial     New pt consult, external hemorrhoid.        Vitals:    02/07/18 1359   BP: 108/69   BP Location: Left arm   Patient Position: Chair   Cuff Size: Adult Regular   Pulse: 83   Temp: 98.2  F (36.8  C)   TempSrc: Oral   SpO2: 98%   Weight: 137 lb 6.4 oz       Body mass index is 22.62 kg/(m^2).    Shefali CLEARY LPN                  Physical Examination:  /69 (BP Location: Left arm, Patient Position: Chair, Cuff Size: Adult Regular)  Pulse 83  Temp 98.2  F (36.8  C) (Oral)  Wt 137 lb 6.4 oz  SpO2 98%  BMI 22.62 kg/m2  General: alert, oriented, in no acute distress, sitting comfortably  HEENT: mucous membranes moist  Perianal external examination: Exam was chaperoned by Shefali Hess LPN  Perianal skin: Intact with no excoriation or lichenification.  Lesions: No evidence of an external lesion, nodularity, or induration in the perianal region.  Eversion of buttocks: There was evidence of an anal fissure. Details: left anterior anal fissure with skin tag present.  Skin tags or external hemorrhoids: Yes: skin tag in the left anterior position.  Digital rectal examination: Was deferred in order not to cause further trauma    Anoscopy: Was deferred in order not to cause further trauma    Total face to face time was 20 minutes, >50% counseling.    CALIXTO Mauricio, NP-C  Colon and Rectal Surgery   [facility]    This note was created using speech recognition software and may contain unintended word  substitutions.      CALIXTO Lombardi CNP

## 2018-06-04 ENCOUNTER — RADIANT APPOINTMENT (OUTPATIENT)
Dept: GENERAL RADIOLOGY | Facility: CLINIC | Age: 48
End: 2018-06-04
Attending: PHYSICIAN ASSISTANT
Payer: MEDICARE

## 2018-06-04 ENCOUNTER — OFFICE VISIT (OUTPATIENT)
Dept: FAMILY MEDICINE | Facility: CLINIC | Age: 48
End: 2018-06-04
Payer: MEDICARE

## 2018-06-04 ENCOUNTER — TELEPHONE (OUTPATIENT)
Dept: FAMILY MEDICINE | Facility: CLINIC | Age: 48
End: 2018-06-04

## 2018-06-04 VITALS
SYSTOLIC BLOOD PRESSURE: 111 MMHG | HEIGHT: 65 IN | HEART RATE: 66 BPM | DIASTOLIC BLOOD PRESSURE: 66 MMHG | RESPIRATION RATE: 16 BRPM | BODY MASS INDEX: 22.81 KG/M2 | WEIGHT: 136.9 LBS | TEMPERATURE: 98.3 F | OXYGEN SATURATION: 98 %

## 2018-06-04 DIAGNOSIS — R31.29 MICROSCOPIC HEMATURIA: ICD-10-CM

## 2018-06-04 DIAGNOSIS — R10.31 RLQ ABDOMINAL PAIN: Primary | ICD-10-CM

## 2018-06-04 DIAGNOSIS — K62.89 ANAL IRRITATION: ICD-10-CM

## 2018-06-04 DIAGNOSIS — R10.31 RLQ ABDOMINAL PAIN: ICD-10-CM

## 2018-06-04 LAB
ALBUMIN UR-MCNC: NEGATIVE MG/DL
APPEARANCE UR: CLEAR
BACTERIA #/AREA URNS HPF: ABNORMAL /HPF
BILIRUB UR QL STRIP: NEGATIVE
COLOR UR AUTO: YELLOW
ERYTHROCYTE [DISTWIDTH] IN BLOOD BY AUTOMATED COUNT: 11.7 % (ref 10–15)
GLUCOSE UR STRIP-MCNC: NEGATIVE MG/DL
HCT VFR BLD AUTO: 40.5 % (ref 35–47)
HGB BLD-MCNC: 13.4 G/DL (ref 11.7–15.7)
HGB UR QL STRIP: ABNORMAL
KETONES UR STRIP-MCNC: NEGATIVE MG/DL
LEUKOCYTE ESTERASE UR QL STRIP: NEGATIVE
MCH RBC QN AUTO: 31.9 PG (ref 26.5–33)
MCHC RBC AUTO-ENTMCNC: 33.1 G/DL (ref 31.5–36.5)
MCV RBC AUTO: 96 FL (ref 78–100)
MUCOUS THREADS #/AREA URNS LPF: PRESENT /LPF
NITRATE UR QL: NEGATIVE
NON-SQ EPI CELLS #/AREA URNS LPF: ABNORMAL /LPF
PH UR STRIP: 5.5 PH (ref 5–7)
PLATELET # BLD AUTO: 210 10E9/L (ref 150–450)
RBC # BLD AUTO: 4.2 10E12/L (ref 3.8–5.2)
RBC #/AREA URNS AUTO: ABNORMAL /HPF
SOURCE: ABNORMAL
SP GR UR STRIP: 1.02 (ref 1–1.03)
UROBILINOGEN UR STRIP-ACNC: 0.2 EU/DL (ref 0.2–1)
WBC # BLD AUTO: 11.3 10E9/L (ref 4–11)
WBC #/AREA URNS AUTO: ABNORMAL /HPF

## 2018-06-04 PROCEDURE — 74019 RADEX ABDOMEN 2 VIEWS: CPT | Mod: FY

## 2018-06-04 PROCEDURE — 99214 OFFICE O/P EST MOD 30 MIN: CPT | Performed by: PHYSICIAN ASSISTANT

## 2018-06-04 PROCEDURE — 85027 COMPLETE CBC AUTOMATED: CPT | Performed by: PHYSICIAN ASSISTANT

## 2018-06-04 PROCEDURE — 81001 URINALYSIS AUTO W/SCOPE: CPT | Performed by: PHYSICIAN ASSISTANT

## 2018-06-04 PROCEDURE — 36415 COLL VENOUS BLD VENIPUNCTURE: CPT | Performed by: PHYSICIAN ASSISTANT

## 2018-06-04 PROCEDURE — 87086 URINE CULTURE/COLONY COUNT: CPT | Performed by: PHYSICIAN ASSISTANT

## 2018-06-04 RX ORDER — TRIAMCINOLONE ACETONIDE 1 MG/G
CREAM TOPICAL
Qty: 45 G | Refills: 0 | Status: SHIPPED | OUTPATIENT
Start: 2018-06-04 | End: 2019-11-06

## 2018-06-04 RX ORDER — SULFAMETHOXAZOLE/TRIMETHOPRIM 800-160 MG
1 TABLET ORAL 2 TIMES DAILY
Qty: 14 TABLET | Refills: 0 | Status: SHIPPED | OUTPATIENT
Start: 2018-06-04 | End: 2019-11-06

## 2018-06-04 RX ORDER — SULFAMETHOXAZOLE/TRIMETHOPRIM 800-160 MG
1 TABLET ORAL 2 TIMES DAILY
Qty: 14 TABLET | Refills: 0 | Status: SHIPPED | OUTPATIENT
Start: 2018-06-04 | End: 2018-06-04

## 2018-06-04 ASSESSMENT — PAIN SCALES - GENERAL: PAINLEVEL: MODERATE PAIN (4)

## 2018-06-04 NOTE — MR AVS SNAPSHOT
After Visit Summary   6/4/2018    Denilson Sweet    MRN: [id number]           Patient Information     Date Of Birth          1970        Visit Information        Provider Department      6/4/2018 2:40 PM Lisa Ennis PA-C [facility]        Today's Diagnoses     RLQ abdominal pain    -  1    Microscopic hematuria          Care Instructions    Please schedule CT for tomorrow morning   Increase fluids   Take Ibuprofen 800 mg every 8 hours as needed for pain if develop pain again     Start Bactrim 1 tablet twice a day for 7 days to cover for a potential bladder infection           Follow-ups after your visit        Future tests that were ordered for you today     Open Future Orders        Priority Expected Expires Ordered    CT Abdomen Pelvis w/o Contrast STAT  6/4/2019 6/4/2018            Who to contact     If you have questions or need follow up information about today's clinic visit or your schedule please contact [facility] directly at [phone].  Normal or non-critical lab and imaging results will be communicated to you by [employer] Viewing[employer], letter or phone within 4 business days after the clinic has received the results. If you do not hear from us within 7 days, please contact the clinic through [email or web address] or phone. If you have a critical or abnormal lab result, we will notify you by phone as soon as possible.  Submit refill requests through [employer] or call your pharmacy and they will forward the refill request to us. Please allow 3 business days for your refill to be completed.          Additional Information About Your Visit        MyChart Information     [employer] gives you secure access to your electronic health record. If you see a primary care provider, you can also send messages to your care team and make appointments. If you have questions, please call your primary care clinic.  If you do not have a primary care provider, please call  "[phone] and they will assist you.        Care EveryWhere ID     This is your Care EveryWhere ID. This could be used by other organizations to access your [place] medical records  [id number]        Your Vitals Were     Pulse Temperature Respirations Height Last Period Pulse Oximetry    66 98.3  F (36.8  C) (Oral) 16 5' 5.35\" (1.66 m) 07/06/2017 98%    BMI (Body Mass Index)                   22.54 kg/m2            Blood Pressure from Last 3 Encounters:   06/04/18 111/66   02/07/18 108/69   01/25/18 112/66    Weight from Last 3 Encounters:   06/04/18 136 lb 14.4 oz (62.1 kg)   02/07/18 137 lb 6.4 oz (62.3 kg)   01/25/18 134 lb 14.4 oz (61.2 kg)              We Performed the Following     CBC with platelets     UA with Microscopic     Urine Culture Aerobic Bacterial        Primary Care Provider Office Phone # Fax #    Lisa Ennis PA-C [phone]       [address] [id number]        Equal Access to Services     [facility]: Hadii aad ku hadasho Soomaali, waaxda luqadaha, qaybta kaalmada adeegyada, jennifer campbell haywilfredo marquis . So [facility] [phone].    ATENCIÓN: Si habla español, tiene a colon disposición servicios gratuitos de asistencia lingüística. Llame al [phone].    We comply with applicable federal civil rights laws and [place] laws. We do not discriminate on the basis of race, color, national origin, age, disability, sex, sexual orientation, or gender identity.            Thank you!     Thank you for choosing [facility]  for your care. Our goal is always to provide you with excellent care. Hearing back from our patients is one way we can continue to improve our services. Please take a few minutes to complete the written survey that you may receive in the mail after your visit with us. Thank you!             Your Updated Medication List - Protect others around you: Learn how to safely use, store and throw away your " medicines at www.disposemymeds.org.          This list is accurate as of 6/4/18  4:02 PM.  Always use your most recent med list.                   Brand Name Dispense Instructions for use Diagnosis    AMBIEN 5 MG tablet   Generic drug:  zolpidem      Take by mouth nightly as needed for sleep        CALCIUM 600 + D PO      None Entered        diltiazem 2% in PLO cream (FV COMPOUNDED) 2% Gel     60 g    To anal opening three times daily.  Use a pea-sized amount.  Store at room temperature.    Anal fissure       FISH OIL PO      Take  by mouth. Pt unsure dose        LORazepam 0.5 MG tablet    ATIVAN     Take 1 tablet by mouth 2 times daily as needed.        MULTI-VITAMIN PO      Take  by mouth.        OLANZapine 20 MG tablet    zyPREXA     Take 2 tablets by mouth At Bedtime.        traZODone 100 MG tablet    DESYREL     TAKE 1 TABLET BY MOUTH DAILY AT BEDTIME.

## 2018-06-04 NOTE — TELEPHONE ENCOUNTER
"Denilson Sweet is a 47 year old female  who calls with abdominal pain.    NURSING ASSESSMENT:  The pain began yesterday evening.  Pain scale (0-10): 3/10 now, was more severe about an hour ago (8/10)  The pain is described as \"bad\" cramping and is located RLQ, which is with radiation to low back.  Symptom associated with the abdominal pain: nausea. Has only eaten breakfast this morning, no other foods. Denies vomiting, though felt like she would and was bent over toilet prepared to vomit earlier today.  Patient has not had previous abdominal surgery. Has history of ruptured ovarian cyst and notes feels very similar but this happened on the left. Today's pain in RLQ. Area is tender to touch, denies rebound pain, not very painful to touch at the moment. Pt reports having massaged the area several times today. Denies weakness or lightheadedness. Denies any urinary or bowel problems or changes. Pt stated she felt like she could taste some blood come up her esophagus when was almost going to vomit earlier today. Pain not as severe now as earlier, though has been pretty persistent since last night. Denies any vaginal bleeding or discharge.  Pain is aggravated by nothing, and relieved by nothing.  Allergies: No Known Allergies    MEDICATIONS:   Taking medication(s) as prescribed? Yes    NURSING PLAN: Nursing advice to patient To be seen today in clinic for evaluation. Per triage guide book, seek medicatl care within 2-4 hours if have RLQ pain with poor appetite and nausea.     RECOMMENDED DISPOSITION:  See within next few hours. Pt does not live very far from [facility] and see's Benita Ennis as her PCP. Benita has walk-in schedule today and has openings from 2:40-4:20 pm.   Will comply with recommendation: Yes  If further questions/concerns or if symptoms do not improve, worsen or new symptoms develop, call your PCP or [facility] as soon as possible.    Guideline used:  Telephone Triage Protocols for Nurses, " Fifth Edition, Naty Wilder RN

## 2018-06-04 NOTE — PROGRESS NOTES
SUBJECTIVE:   Denilson Sweet is a 47 year old female who presents to clinic today for the following health issues:    ABDOMINAL and FLANK PAIN     Onset: Last night    Description:   Character: Cramping  Location: right lower quadrant  Radiation: Back lower     Intensity: mild, moderate    Progression of Symptoms:  intermittent    Accompanying Signs & Symptoms:  Fever/Chills?: no   Gas/Bloating: no   Nausea: YES  Vomitting: no   Diarrhea?: YES  Constipation:no   Dysuria or Hematuria: no      History:   Trauma: YES- cyst   Previous similar pain: no    Previous tests done: none    Precipitating factors:   Does the pain change with:     Food: unknown      BM: no     Urination: no     Alleviating factors:  Hemorrhoids?     Therapies Tried and outcome: None    LMP:  Went through menopause for over a year ago      Patient reports that last night she has developed mild RLQ pain, hen this morning became sharp and started to shoot in her back and at the same time  She developed nausea and sour taste in the mouth. This bout of pain lasted a few minutes, then it started to subside. Now patient is mostly pain free, unless she presses hard on her lower abdomen. No nausea currently.     Rash      Duration: 1 week    Description  Location: buttocks and anal  Itching: severe    Intensity:  moderate    Accompanying signs and symptoms: None    History (similar episodes/previous evaluation): None    Precipitating or alleviating factors:  New exposures:  None  Recent travel: no      Therapies tried and outcome: none      Problem list and histories reviewed & adjusted, as indicated.  Additional history: as documented    Patient Active Problem List   Diagnosis     Genital herpes     CARDIOVASCULAR SCREENING; LDL GOAL LESS THAN 160     Bipolar 1 disorder (H)     Migraine     Closed displaced fracture of head of right radius, initial encounter     Past Surgical History:   Procedure Laterality Date     BIOPSY BREAST      RT      "LAPAROSCOPY PROCEDURE UNLISTED      ABLATION       Social History   Substance Use Topics     Smoking status: Never Smoker     Smokeless tobacco: Never Used     Alcohol use No     Family History   Problem Relation Age of Onset     OSTEOPOROSIS Mother      Cancer - colorectal Paternal Grandmother          Current Outpatient Prescriptions   Medication Sig Dispense Refill     sulfamethoxazole-trimethoprim (BACTRIM DS/SEPTRA DS) 800-160 MG per tablet Take 1 tablet by mouth 2 times daily 14 tablet 0     triamcinolone (KENALOG) 0.1 % cream Apply sparingly to affected area three times daily as needed 45 g 0     CALCIUM 600 + D OR None Entered       diltiazem 2% in PLO cream, FV COMPOUNDED, 2% GEL To anal opening three times daily.  Use a pea-sized amount.  Store at room temperature. 60 g 0     LORazepam (ATIVAN) 0.5 MG tablet Take 1 tablet by mouth 2 times daily as needed.       Multiple Vitamin (MULTI-VITAMIN PO) Take  by mouth.       OLANZapine (ZYPREXA) 20 MG tablet Take 2 tablets by mouth At Bedtime.       Omega-3 Fatty Acids (FISH OIL PO) Take  by mouth. Pt unsure dose       traZODone (DESYREL) 100 MG tablet TAKE 1 TABLET BY MOUTH DAILY AT BEDTIME.  3     zolpidem (AMBIEN) 5 MG tablet Take by mouth nightly as needed for sleep       No Known Allergies    Reviewed and updated as needed this visit by clinical staff  Tobacco  Allergies  Meds  Problems  Med Hx  Surg Hx  Fam Hx  Soc Hx        Reviewed and updated as needed this visit by Provider  Allergies  Meds  Problems         ROS:  Constitutional, HEENT, cardiovascular, pulmonary, GI, , musculoskeletal, neuro, skin, endocrine and psych systems are negative, except as otherwise noted.    OBJECTIVE:     /66 (BP Location: Right arm, Patient Position: Sitting, Cuff Size: Adult Regular)  Pulse 66  Temp 98.3  F (36.8  C) (Oral)  Resp 16  Ht 5' 5.35\" (1.66 m)  Wt 136 lb 14.4 oz (62.1 kg)  LMP 07/06/2017  SpO2 98%  BMI 22.54 kg/m2  Body mass index is " 22.54 kg/(m^2).  GENERAL: healthy, alert and no distress  NECK: no adenopathy, no asymmetry, masses, or scars and thyroid normal to palpation  RESP: lungs clear to auscultation - no rales, rhonchi or wheezes  CV: regular rate and rhythm, normal S1 S2, no S3 or S4, no murmur, click or rub, no peripheral edema and peripheral pulses strong  ABDOMEN: soft, nontender, no hepatosplenomegaly, no masses and bowel sounds normal  RECTAL (female): severe erythematous irritation of the skin of anal area and inner buttocks, 2 non inflamed hemorrhoids-non tender  MS: no gross musculoskeletal defects noted, no edema  BACK: no CVA tenderness, no paralumbar tenderness    Diagnostic Test Results:  Results for orders placed or performed in visit on 06/04/18 (from the past 24 hour(s))   CBC with platelets   Result Value Ref Range    WBC 11.3 (H) 4.0 - 11.0 10e9/L    RBC Count 4.20 3.8 - 5.2 10e12/L    Hemoglobin 13.4 11.7 - 15.7 g/dL    Hematocrit 40.5 35.0 - 47.0 %    MCV 96 78 - 100 fl    MCH 31.9 26.5 - 33.0 pg    MCHC 33.1 31.5 - 36.5 g/dL    RDW 11.7 10.0 - 15.0 %    Platelet Count 210 150 - 450 10e9/L   UA with Microscopic   Result Value Ref Range    Color Urine Yellow     Appearance Urine Clear     Glucose Urine Negative NEG^Negative mg/dL    Bilirubin Urine Negative NEG^Negative    Ketones Urine Negative NEG^Negative mg/dL    Specific Gravity Urine 1.020 1.003 - 1.035    pH Urine 5.5 5.0 - 7.0 pH    Protein Albumin Urine Negative NEG^Negative mg/dL    Urobilinogen Urine 0.2 0.2 - 1.0 EU/dL    Nitrite Urine Negative NEG^Negative    Blood Urine Large (A) NEG^Negative    Leukocyte Esterase Urine Negative NEG^Negative    Source Midstream Urine     WBC Urine 0 - 5 OTO5^0 - 5 /HPF    RBC Urine  (A) OTO2^O - 2 /HPF    Squamous Epithelial /LPF Urine Few FEW^Few /LPF    Bacteria Urine Few (A) NEG^Negative /HPF    Mucous Urine Present (A) NEG^Negative /LPF       ASSESSMENT/PLAN:       ICD-10-CM    1. RLQ abdominal pain R10.31 CBC  with platelets     UA with Microscopic     XR Abdomen 2 Views     Urine Culture Aerobic Bacterial     CT Abdomen Pelvis w/o Contrast     sulfamethoxazole-trimethoprim (BACTRIM DS/SEPTRA DS) 800-160 MG per tablet     DISCONTINUED: sulfamethoxazole-trimethoprim (BACTRIM DS/SEPTRA DS) 800-160 MG per tablet   2. Microscopic hematuria R31.29 CT Abdomen Pelvis w/o Contrast     sulfamethoxazole-trimethoprim (BACTRIM DS/SEPTRA DS) 800-160 MG per tablet     DISCONTINUED: sulfamethoxazole-trimethoprim (BACTRIM DS/SEPTRA DS) 800-160 MG per tablet   3. Anal irritation K62.89 triamcinolone (KENALOG) 0.1 % cream     Currently patient is mostly pain free, she rated discomfort on 2/10 with pressure on the lower abdomen. No back or flank pain at this time.  urinalysis showed large blood  DDX include: UTI, ureteral Stone, ovarian cyst rupture, appendicitis     Cbc is within normal limits, no fever, no vomiting, no diarrhea-appendicitis is less likely, but still can't get excluded.     All possible DDx were discussed with the patient and that without CT scan further diagnosis is difficult. Patient was instructed to get CT in the morning if pain is still present.   If patient develops fever, increasing pain or vomiting, she should do to ED as soon as possible.     Patient was treated for acute cystitis     Start Bactrim 1 tablet twice a day for 7 days to cover for a potential bladder infection   Increase fluids   Take Ibuprofen 800 mg every 8 hours as needed for pain if develop pain again         3. Triamcinolone cream three times a day for anal skin irritation.       Lisa Ennis PA-C  [facility]

## 2018-06-04 NOTE — PATIENT INSTRUCTIONS
Please schedule CT for tomorrow morning   Increase fluids   Take Ibuprofen 800 mg every 8 hours as needed for pain if develop pain again       Start Bactrim 1 tablet twice a day for 7 days to cover for a potential bladder infection

## 2018-06-05 ENCOUNTER — MYC MEDICAL ADVICE (OUTPATIENT)
Dept: FAMILY MEDICINE | Facility: CLINIC | Age: 48
End: 2018-06-05

## 2018-06-05 ENCOUNTER — RADIANT APPOINTMENT (OUTPATIENT)
Dept: CT IMAGING | Facility: CLINIC | Age: 48
End: 2018-06-05
Attending: PHYSICIAN ASSISTANT
Payer: MEDICARE

## 2018-06-05 DIAGNOSIS — R31.29 MICROSCOPIC HEMATURIA: ICD-10-CM

## 2018-06-05 DIAGNOSIS — N20.1 URETERAL STONE: Primary | ICD-10-CM

## 2018-06-05 DIAGNOSIS — R10.31 RLQ ABDOMINAL PAIN: ICD-10-CM

## 2018-06-05 LAB
BACTERIA SPEC CULT: NORMAL
RADIOLOGIST FLAGS: ABNORMAL
SPECIMEN SOURCE: NORMAL

## 2018-06-05 PROCEDURE — 74176 CT ABD & PELVIS W/O CONTRAST: CPT | Performed by: RADIOLOGY

## 2018-06-05 RX ORDER — TAMSULOSIN HYDROCHLORIDE 0.4 MG/1
0.8 CAPSULE ORAL DAILY
Qty: 30 CAPSULE | Refills: 0 | Status: SHIPPED | OUTPATIENT
Start: 2018-06-05 | End: 2020-09-15

## 2018-06-05 RX ORDER — ONDANSETRON 4 MG/1
4 TABLET, FILM COATED ORAL EVERY 6 HOURS PRN
Qty: 18 TABLET | Refills: 0 | Status: SHIPPED | OUTPATIENT
Start: 2018-06-05 | End: 2019-11-06

## 2018-06-05 RX ORDER — OXYCODONE AND ACETAMINOPHEN 5; 325 MG/1; MG/1
1 TABLET ORAL EVERY 6 HOURS PRN
Qty: 20 TABLET | Refills: 0 | Status: SHIPPED | OUTPATIENT
Start: 2018-06-05 | End: 2019-11-06

## 2018-06-05 NOTE — TELEPHONE ENCOUNTER
Pt was called with the results of the CT that shows a probably 5 mm obstructing stone. Pt was referred to urology.  Pt was also prescribed Zofran, Flomax and Percocet -were sent to  pharmacy. Pt will pick it up asap.   Pt will also get strainer while she is here.   Make appt with urology asap.   All questions were answered. Pt repored understanding.    Benita Ennis PAC

## 2018-06-05 NOTE — TELEPHONE ENCOUNTER
Script for percocet is signed by Dr. Georges and brought to the pharmacy.  Mao Hernandez,  [facility]

## 2018-06-06 ENCOUNTER — TRANSFERRED RECORDS (OUTPATIENT)
Dept: HEALTH INFORMATION MANAGEMENT | Facility: CLINIC | Age: 48
End: 2018-06-06

## 2018-06-12 DIAGNOSIS — N20.1 URETERAL STONE: ICD-10-CM

## 2018-06-12 PROCEDURE — 99000 SPECIMEN HANDLING OFFICE-LAB: CPT | Performed by: PHYSICIAN ASSISTANT

## 2018-06-12 PROCEDURE — 82365 CALCULUS SPECTROSCOPY: CPT | Mod: 90 | Performed by: PHYSICIAN ASSISTANT

## 2018-06-14 ENCOUNTER — MYC MEDICAL ADVICE (OUTPATIENT)
Dept: FAMILY MEDICINE | Facility: CLINIC | Age: 48
End: 2018-06-14

## 2018-06-15 LAB
APPEARANCE STONE: NORMAL
COMPN STONE: NORMAL
NUMBER STONE: 1
SIZE STONE: NORMAL MM
WT STONE: 29 MG

## 2018-07-05 ENCOUNTER — OFFICE VISIT (OUTPATIENT)
Dept: FAMILY MEDICINE | Facility: CLINIC | Age: 48
End: 2018-07-05
Payer: MEDICARE

## 2018-07-05 ENCOUNTER — TELEPHONE (OUTPATIENT)
Dept: ULTRASOUND IMAGING | Facility: CLINIC | Age: 48
End: 2018-07-05

## 2018-07-05 VITALS
SYSTOLIC BLOOD PRESSURE: 99 MMHG | RESPIRATION RATE: 16 BRPM | WEIGHT: 138.2 LBS | HEART RATE: 79 BPM | DIASTOLIC BLOOD PRESSURE: 65 MMHG | BODY MASS INDEX: 23.03 KG/M2 | TEMPERATURE: 98.3 F | OXYGEN SATURATION: 98 % | HEIGHT: 65 IN

## 2018-07-05 DIAGNOSIS — N64.9 DISORDER OF BREAST: ICD-10-CM

## 2018-07-05 DIAGNOSIS — R92.2 INCONCLUSIVE MAMMOGRAM: ICD-10-CM

## 2018-07-05 DIAGNOSIS — L30.9 ACUTE DERMATITIS: ICD-10-CM

## 2018-07-05 DIAGNOSIS — N63.0 BREAST LUMP IN FEMALE: Primary | ICD-10-CM

## 2018-07-05 DIAGNOSIS — B37.2 YEAST INFECTION OF THE SKIN: ICD-10-CM

## 2018-07-05 DIAGNOSIS — N64.9 BREAST DISORDER: Primary | ICD-10-CM

## 2018-07-05 PROCEDURE — 99214 OFFICE O/P EST MOD 30 MIN: CPT | Performed by: PHYSICIAN ASSISTANT

## 2018-07-05 RX ORDER — DIVALPROEX SODIUM 250 MG/1
250 TABLET, DELAYED RELEASE ORAL 3 TIMES DAILY
COMMUNITY
End: 2019-11-06

## 2018-07-05 RX ORDER — FLUCONAZOLE 150 MG/1
150 TABLET ORAL ONCE
Qty: 5 TABLET | Refills: 0 | Status: SHIPPED | OUTPATIENT
Start: 2018-07-05 | End: 2018-07-05

## 2018-07-05 RX ORDER — NYSTATIN 100000 U/G
CREAM TOPICAL 3 TIMES DAILY
Qty: 60 G | Refills: 3 | Status: SHIPPED | OUTPATIENT
Start: 2018-07-05 | End: 2018-07-19

## 2018-07-05 ASSESSMENT — PAIN SCALES - GENERAL: PAINLEVEL: NO PAIN (0)

## 2018-07-05 NOTE — TELEPHONE ENCOUNTER
There is no order in Pt's chart for US Breast.  Per [facility] protocol. All patient's 40 and older with breast pain or lump must have a Diagnositic Mammo and Us Breast.    If you would like this pt to have this exam done. Please place order.       Call patient if you have any questions.  When complete, please let me know. So I may call the patient to get them scheduled.      Thank you for your time,   [facility]  (Formally known as [facility])  Office: [phone]   Fax: [phone]

## 2018-07-05 NOTE — PROGRESS NOTES
SUBJECTIVE:   Denilson Sweet is a 47 year old female who presents to clinic today for the following health issues:    Rash  Onset: few weeks    Description: patient has chronically dry, sensitive skin that causes dry red skin. However, patient developed another rash in her arm pits and between buttocks that is spreading   Location: Neck down  Character: flakey, painful, burning, red, stings  Itching (Pruritis): YES    Progression of Symptoms:  worsening    Accompanying Signs & Symptoms:  Fever: no   Body aches or joint pain: no   Sore throat symptoms: no   Recent cold symptoms: no     History:   Previous similar rash: YES- but not as bad     Precipitating factors:   Exposure to similar rash: no   New exposures: medication Kidney stones    Recent travel: YES- [place]     Therapies Tried and outcome: None just lotion     Breast lump      Duration: noticed 1 weeks ago    Description (location/character/radiation): small round lump in the left upper breast    Intensity:      Accompanying signs and symptoms: none    History (similar episodes/previous evaluation): yes had breast cyst in the right berast in the past     Precipitating or alleviating factors: None    Therapies tried and outcome: None       Problem list and histories reviewed & adjusted, as indicated.  Additional history: as documented    Patient Active Problem List   Diagnosis     Genital herpes     CARDIOVASCULAR SCREENING; LDL GOAL LESS THAN 160     Bipolar 1 disorder (H)     Migraine     Closed displaced fracture of head of right radius, initial encounter     Past Surgical History:   Procedure Laterality Date     BIOPSY BREAST      RT     LAPAROSCOPY PROCEDURE UNLISTED      ABLATION       Social History   Substance Use Topics     Smoking status: Never Smoker     Smokeless tobacco: Never Used     Alcohol use No     Family History   Problem Relation Age of Onset     Osteoperosis Mother      Cancer - colorectal Paternal Grandmother          Current  Outpatient Prescriptions   Medication Sig Dispense Refill     CALCIUM 600 + D OR None Entered       diltiazem 2% in PLO cream, FV COMPOUNDED, 2% GEL To anal opening three times daily.  Use a pea-sized amount.  Store at room temperature. 60 g 0     divalproex sodium delayed-release (DEPAKOTE) 250 MG DR tablet Take 250 mg by mouth 3 times daily       fluconazole (DIFLUCAN) 150 MG tablet Take 1 tablet (150 mg) by mouth once for 1 dose Every 3 days for 5 doses 5 tablet 0     LORazepam (ATIVAN) 0.5 MG tablet Take 1 tablet by mouth 2 times daily as needed.       Multiple Vitamin (MULTI-VITAMIN PO) Take  by mouth.       nystatin (MYCOSTATIN) cream Apply topically 3 times daily for 14 days 60 g 3     OLANZapine (ZYPREXA) 20 MG tablet Take 2 tablets by mouth At Bedtime.       Omega-3 Fatty Acids (FISH OIL PO) Take  by mouth. Pt unsure dose       ondansetron (ZOFRAN) 4 MG tablet Take 1 tablet (4 mg) by mouth every 6 hours as needed for nausea 18 tablet 0     oxyCODONE-acetaminophen (PERCOCET) 5-325 MG per tablet Take 1 tablet by mouth every 6 hours as needed for pain 20 tablet 0     sulfamethoxazole-trimethoprim (BACTRIM DS/SEPTRA DS) 800-160 MG per tablet Take 1 tablet by mouth 2 times daily 14 tablet 0     tamsulosin (FLOMAX) 0.4 MG capsule Take 2 capsules (0.8 mg) by mouth daily 30 capsule 0     traZODone (DESYREL) 100 MG tablet TAKE 2 TABLET BY MOUTH DAILY AT BEDTIME.  3     triamcinolone (KENALOG) 0.1 % cream Apply sparingly to affected area three times daily as needed 45 g 0     zolpidem (AMBIEN) 5 MG tablet Take by mouth nightly as needed for sleep       No Known Allergies    Reviewed and updated as needed this visit by clinical staff  Tobacco  Allergies  Meds  Problems  Med Hx  Surg Hx  Fam Hx  Soc Hx        Reviewed and updated as needed this visit by Provider  Allergies  Meds  Problems         ROS:  Constitutional, HEENT, cardiovascular, pulmonary, GI, , musculoskeletal, neuro, skin, endocrine and psych  "systems are negative, except as otherwise noted.    OBJECTIVE:     BP 99/65 (BP Location: Right arm, Patient Position: Sitting, Cuff Size: Adult Regular)  Pulse 79  Temp 98.3  F (36.8  C) (Oral)  Resp 16  Ht 5' 5.35\" (1.66 m)  Wt 138 lb 3.2 oz (62.7 kg)  SpO2 98%  BMI 22.75 kg/m2  Body mass index is 22.75 kg/(m^2).  GENERAL: healthy, alert and no distress  NECK: no adenopathy, no asymmetry, masses, or scars and thyroid normal to palpation  RESP: lungs clear to auscultation - no rales, rhonchi or wheezes  BREAST: no palpable axillary masses or adenopathy and <1 cm rubbery, round mobile nodule in the left upper quadrant of the left breast   CV: regular rate and rhythm, normal S1 S2, no S3 or S4, no murmur, click or rub, no peripheral edema and peripheral pulses strong  ABDOMEN: soft, nontender, no hepatosplenomegaly, no masses and bowel sounds normal  MS: no gross musculoskeletal defects noted, no edema  SKIN: there is a generalized skin dryness, on abdomen, thighs and lower legs, also there is bright red confluent exanthem with satellite lesions on the bilateral axillary and between buttocks regions     Diagnostic Test Results:  Mammogram was ordered    ASSESSMENT/PLAN:       ICD-10-CM    1. Breast lump in female N63.0 MA Diagnostic Digital Bilateral   2. Disorder of breast  N64.9 MA Diagnostic Digital Bilateral   3. Acute dermatitis L30.9 nystatin (MYCOSTATIN) cream   4. Yeast infection of the skin B37.2 nystatin (MYCOSTATIN) cream     fluconazole (DIFLUCAN) 150 MG tablet   1,2. Schedule mammogram as soon as possible     3. 2 different components- dry skin dermatitis and yeast infection f skin  Apply Cetaphil cream on the rest of the skin twice a day      4.Diflucan 150g 1 tablet every 3 day for 5 doses   Nystatin cream apply three times a day to behind and arm pits areas     Lisa Ennis PA-C  [facility]  "

## 2018-07-05 NOTE — PATIENT INSTRUCTIONS
Diflucan 150g 1 tablet every 3 day for 5 doses   Nystatin cream apply three times a day to behind and arm pits areas     Apply Cetaphil cream on the rest of the skin twice a day

## 2018-07-05 NOTE — MR AVS SNAPSHOT
After Visit Summary   7/5/2018    Denilson Sweet    MRN: [id number]           Patient Information     Date Of Birth          1970        Visit Information        Provider Department      7/5/2018 2:20 PM Lisa Ennis PA-C [facility]        Today's Diagnoses     Breast lump in female    -  1    Disorder of breast         Acute dermatitis        Yeast infection of the skin          Care Instructions    Diflucan 150g 1 tablet every 3 day for 5 doses   Nystatin cream apply three times a day to behind and arm pits areas     Apply Cetaphil cream on the rest of the skin twice a day           Follow-ups after your visit        Future tests that were ordered for you today     Open Future Orders        Priority Expected Expires Ordered    MA Diagnostic Digital Bilateral Routine  7/5/2019 7/5/2018            Who to contact     If you have questions or need follow up information about today's clinic visit or your schedule please contact [facility] directly at [phone].  Normal or non-critical lab and imaging results will be communicated to you by [employer], letter or phone within 4 business days after the clinic has received the results. If you do not hear from us within 7 days, please contact the clinic through [employer] or phone. If you have a critical or abnormal lab result, we will notify you by phone as soon as possible.  Submit refill requests through [employer] or call your pharmacy and they will forward the refill request to us. Please allow 3 business days for your refill to be completed.          Additional Information About Your Visit        [employer] Information     [employer] gives you secure access to your electronic health record. If you see a primary care provider, you can also send messages to your care team and make appointments. If you have questions, please call your primary care clinic.  If you do not have a primary care provider, please  "call [phone] and they will assist you.        Care EveryWhere ID     This is your Care EveryWhere ID. This could be used by other organizations to access your [facility] medical records  [id number]        Your Vitals Were     Pulse Temperature Respirations Height Pulse Oximetry BMI (Body Mass Index)    79 98.3  F (36.8  C) (Oral) 16 5' 5.35\" (1.66 m) 98% 22.75 kg/m2       Blood Pressure from Last 3 Encounters:   07/05/18 99/65   06/04/18 111/66   02/07/18 108/69    Weight from Last 3 Encounters:   07/05/18 138 lb 3.2 oz (62.7 kg)   06/04/18 136 lb 14.4 oz (62.1 kg)   02/07/18 137 lb 6.4 oz (62.3 kg)                 Today's Medication Changes          These changes are accurate as of 7/5/18  2:40 PM.  If you have any questions, ask your nurse or doctor.               Start taking these medicines.        Dose/Directions    fluconazole 150 MG tablet   Commonly known as:  DIFLUCAN   Used for:  Yeast infection of the skin   Started by:  Lisa Ennis PA-C        Dose:  150 mg   Take 1 tablet (150 mg) by mouth once for 1 dose Every 3 days for 5 doses   Quantity:  5 tablet   Refills:  0       nystatin cream   Commonly known as:  MYCOSTATIN   Used for:  Yeast infection of the skin, Acute dermatitis   Started by:  Lisa Ennis PA-C        Apply topically 3 times daily for 14 days   Quantity:  60 g   Refills:  3            Where to get your medicines      These medications were sent to [facility] - [place], [place] - [address]  [address], [facility] [address]     Phone:  [phone]     fluconazole 150 MG tablet    nystatin cream                Primary Care Provider Office Phone # Fax #    Lisa Ennis PA-C [phone]       [address]  [facility] [address]        Equal Access to Services     KELLEY SHEPHERD AH: Abram Reich, warigoda luqelizabet wood waxay idiin hayaan adeeg" genarolandonarmin marquis ah. So [facility] [phone].    ATENCIÓN: Si liu damico, tiene a colon disposición servicios gratuitos de asistencia lingüística. Alma gipson [phone].    We comply with applicable federal civil rights laws and [place] laws. We do not discriminate on the basis of race, color, national origin, age, disability, sex, sexual orientation, or gender identity.            Thank you!     Thank you for choosing [facility]  for your care. Our goal is always to provide you with excellent care. Hearing back from our patients is one way we can continue to improve our services. Please take a few minutes to complete the written survey that you may receive in the mail after your visit with us. Thank you!             Your Updated Medication List - Protect others around you: Learn how to safely use, store and throw away your medicines at www.disposemymeds.org.          This list is accurate as of 7/5/18  2:40 PM.  Always use your most recent med list.                   Brand Name Dispense Instructions for use Diagnosis    AMBIEN 5 MG tablet   Generic drug:  zolpidem      Take by mouth nightly as needed for sleep        CALCIUM 600 + D PO      None Entered        diltiazem 2% in PLO cream (FV COMPOUNDED) 2% Gel     60 g    To anal opening three times daily.  Use a pea-sized amount.  Store at room temperature.    Anal fissure       divalproex sodium delayed-release 250 MG DR tablet    DEPAKOTE     Take 250 mg by mouth 3 times daily        FISH OIL PO      Take  by mouth. Pt unsure dose        fluconazole 150 MG tablet    DIFLUCAN    5 tablet    Take 1 tablet (150 mg) by mouth once for 1 dose Every 3 days for 5 doses    Yeast infection of the skin       LORazepam 0.5 MG tablet    ATIVAN     Take 1 tablet by mouth 2 times daily as needed.        MULTI-VITAMIN PO      Take  by mouth.        nystatin cream    MYCOSTATIN    60 g    Apply topically 3 times daily for 14 days    Yeast infection of the skin,  Acute dermatitis       OLANZapine 20 MG tablet    zyPREXA     Take 2 tablets by mouth At Bedtime.        ondansetron 4 MG tablet    ZOFRAN    18 tablet    Take 1 tablet (4 mg) by mouth every 6 hours as needed for nausea    Ureteral stone       oxyCODONE-acetaminophen 5-325 MG per tablet    PERCOCET    20 tablet    Take 1 tablet by mouth every 6 hours as needed for pain    Ureteral stone       sulfamethoxazole-trimethoprim 800-160 MG per tablet    BACTRIM DS/SEPTRA DS    14 tablet    Take 1 tablet by mouth 2 times daily    Microscopic hematuria, RLQ abdominal pain       tamsulosin 0.4 MG capsule    FLOMAX    30 capsule    Take 2 capsules (0.8 mg) by mouth daily    Ureteral stone       traZODone 100 MG tablet    DESYREL     TAKE 2 TABLET BY MOUTH DAILY AT BEDTIME.        triamcinolone 0.1 % cream    KENALOG    45 g    Apply sparingly to affected area three times daily as needed    Anal irritation

## 2018-07-11 ENCOUNTER — MYC MEDICAL ADVICE (OUTPATIENT)
Dept: FAMILY MEDICINE | Facility: CLINIC | Age: 48
End: 2018-07-11

## 2018-07-11 DIAGNOSIS — R21 RASH: Primary | ICD-10-CM

## 2018-07-13 ENCOUNTER — RADIANT APPOINTMENT (OUTPATIENT)
Dept: ULTRASOUND IMAGING | Facility: CLINIC | Age: 48
End: 2018-07-13
Attending: PHYSICIAN ASSISTANT
Payer: MEDICARE

## 2018-07-13 ENCOUNTER — RADIANT APPOINTMENT (OUTPATIENT)
Dept: MAMMOGRAPHY | Facility: CLINIC | Age: 48
End: 2018-07-13
Attending: PHYSICIAN ASSISTANT
Payer: MEDICARE

## 2018-07-13 DIAGNOSIS — N63.0 BREAST LUMP IN FEMALE: ICD-10-CM

## 2018-07-13 DIAGNOSIS — N60.82 OTHER BENIGN MAMMARY DYSPLASIAS OF LEFT BREAST: ICD-10-CM

## 2018-07-13 DIAGNOSIS — N64.9 DISORDER OF BREAST: ICD-10-CM

## 2018-07-13 PROCEDURE — 76642 ULTRASOUND BREAST LIMITED: CPT | Mod: 50 | Performed by: STUDENT IN AN ORGANIZED HEALTH CARE EDUCATION/TRAINING PROGRAM

## 2018-07-13 PROCEDURE — 77066 DX MAMMO INCL CAD BI: CPT | Performed by: STUDENT IN AN ORGANIZED HEALTH CARE EDUCATION/TRAINING PROGRAM

## 2018-07-16 RX ORDER — KETOCONAZOLE 20 MG/G
CREAM TOPICAL 2 TIMES DAILY
Qty: 60 G | Refills: 1 | Status: SHIPPED | OUTPATIENT
Start: 2018-07-16 | End: 2019-11-06

## 2018-07-16 RX ORDER — DESONIDE 0.5 MG/G
CREAM TOPICAL
Qty: 60 G | Refills: 1 | Status: SHIPPED | OUTPATIENT
Start: 2018-07-16 | End: 2019-11-06

## 2018-10-08 ENCOUNTER — TELEPHONE (OUTPATIENT)
Dept: FAMILY MEDICINE | Facility: CLINIC | Age: 48
End: 2018-10-08

## 2018-10-08 NOTE — TELEPHONE ENCOUNTER
These two medications should not be taken together at the same time due to potential sedation can be life treathening. However,  I do not manage patient's mental health medications. Please advise  Patient that she  needs to contact her psychiatrist that prescribes these medications and get advise there as well.     Benita Ennis PAC

## 2018-10-08 NOTE — TELEPHONE ENCOUNTER
Reason for Call:  Other call back    Detailed comments: Patient is taking quite a bit of medications due to being bi-polar. Patient had a kidney stone in June. Patient is looking to see if there is a contraindication with her bi-polar medication for the vitamins she would like to take. Patient's psychologist recommended that she call her primary care provider.     Patient is currently on medications:  Pending Prescriptions:                       Disp   Refills    zolpidem (AMBIEN) 5 MG tablet             60 tab*             Sig: Take by mouth nightly as needed for sleep    traZODone (DESYREL) 100 MG tablet         90 tab*3          OLANZapine (ZYPREXA) 20 MG tablet                             Sig: Take 2 tablets (40 mg) by mouth At Bedtime    LORazepam (ATIVAN) 0.5 MG tablet          60 tab*             Sig: Take 1 tablet (0.5 mg) by mouth 2 times daily as           needed      Patient would like take the following vitamins:    Fish oil pill - 1000 mg  Daily Vitamin  600 mg of Calcium  Glucuse HCI 1500 MG with MSM 1500 mg  Preservision (eye vitamin and mineral supplement - taken in the morning and at night)        Phone Number Patient can be reached at: Cell number on file:    Telephone Information:   Mobile [phone]       Best Time: Anytime    Can we leave a detailed message on this number? YES    Call taken on 10/8/2018 at 10:17 AM by Thuy Woodson

## 2018-10-08 NOTE — TELEPHONE ENCOUNTER
Routing to provider to advise. Pre review of Micromedex, the only major interaction this writer found was with the lorazepam and zyprexa-Ask your doctor before using LORazepam together with OLANZapine. This can cause low blood pressure, shallow breathing, weak pulse, muscle weakness, drowsiness, dizziness and slurred speech. This may be more likely to occur in older adults or those with a debilitating condition. You should be counseled to avoid activities requiring mental alertness until you know how these medications will affect you. If your doctor prescribes these medications together, you may need a dose adjustment or special tests to safely use these medications together.      Mandi Jo RN, BSN

## 2018-10-09 NOTE — TELEPHONE ENCOUNTER
RN will contact patient and relay message below, however, patient asking if okay for her to take any of the vitamins mentioned below in original message. Wondering if will interact with anything she is already taking. Patient psychiatrist advised patient to check with PCP about taking any of the vitamins below.     Routing to provider, do you feel it is safe for patient to take any of the below vitamins, in conjunction with her current meds? Thank you!    Fish oil 1000 mg  Daily Multiple Vitamin  Calcium 600 mg  Preservision eye vitamin, twice daily  Glucosamine 1500 mg/MSM 1500 mg (Move Free/Joint supplement)      Nikky Wilder RN  [facility]

## 2018-10-10 NOTE — TELEPHONE ENCOUNTER
Called and left detailed VM message on patient cell phone and private, identified line. Permission to leave message given below in original message. RN advised that was okay for patient to take all interested vitamins, per PCP. Advised to contact office with any further questions or concerns.    Nikky Wilder RN  [facility] Triage

## 2019-11-04 ENCOUNTER — TELEPHONE (OUTPATIENT)
Dept: FAMILY MEDICINE | Facility: CLINIC | Age: 49
End: 2019-11-04

## 2019-11-04 NOTE — TELEPHONE ENCOUNTER
Reason for Call:  Other symptoms    Detailed comments: Patient has right side pain she is very concerned she will be leaving town and she would like to speak to a nurse regarding this symptom.Please advise pateint    Phone Number Patient can be reached at: Cell number on file:    Telephone Information:   Mobile [phone]       Best Time: ASny    Can we leave a detailed message on this number? YES    Call taken on 11/4/2019 at 9:56 AM by Vidal Mccauley

## 2019-11-04 NOTE — TELEPHONE ENCOUNTER
Patient reporting right side lateral pain in abdominal area only when lying on it.  It is not painful to touch, no pain when standing, is able to go to the GYM and lift weights.  No pain when touching the ABD.  Patient is going to [place] in couple of days.  Patient is only able to come this Wednesday.  OV scheduled with PCP this Wednesday.    Yadi Meneses RN

## 2019-11-06 ENCOUNTER — OFFICE VISIT (OUTPATIENT)
Dept: FAMILY MEDICINE | Facility: CLINIC | Age: 49
End: 2019-11-06
Payer: MEDICARE

## 2019-11-06 ENCOUNTER — ANCILLARY PROCEDURE (OUTPATIENT)
Dept: GENERAL RADIOLOGY | Facility: CLINIC | Age: 49
End: 2019-11-06
Attending: PHYSICIAN ASSISTANT
Payer: MEDICARE

## 2019-11-06 VITALS
HEIGHT: 65 IN | BODY MASS INDEX: 24.64 KG/M2 | WEIGHT: 147.9 LBS | TEMPERATURE: 97.9 F | HEART RATE: 74 BPM | SYSTOLIC BLOOD PRESSURE: 116 MMHG | DIASTOLIC BLOOD PRESSURE: 74 MMHG | OXYGEN SATURATION: 99 % | RESPIRATION RATE: 16 BRPM

## 2019-11-06 DIAGNOSIS — N95.0 POSTMENOPAUSAL BLEEDING: ICD-10-CM

## 2019-11-06 DIAGNOSIS — R10.9 FLANK PAIN: ICD-10-CM

## 2019-11-06 DIAGNOSIS — R10.9 FLANK PAIN: Primary | ICD-10-CM

## 2019-11-06 LAB
ALBUMIN UR-MCNC: NEGATIVE MG/DL
APPEARANCE UR: CLEAR
BACTERIA #/AREA URNS HPF: ABNORMAL /HPF
BILIRUB UR QL STRIP: NEGATIVE
COLOR UR AUTO: YELLOW
ERYTHROCYTE [DISTWIDTH] IN BLOOD BY AUTOMATED COUNT: 12 % (ref 10–15)
FSH SERPL-ACNC: 23.1 IU/L
GLUCOSE UR STRIP-MCNC: NEGATIVE MG/DL
HCG UR QL: NEGATIVE
HCT VFR BLD AUTO: 43.7 % (ref 35–47)
HGB BLD-MCNC: 14.1 G/DL (ref 11.7–15.7)
HGB UR QL STRIP: ABNORMAL
KETONES UR STRIP-MCNC: NEGATIVE MG/DL
LEUKOCYTE ESTERASE UR QL STRIP: NEGATIVE
MCH RBC QN AUTO: 33.5 PG (ref 26.5–33)
MCHC RBC AUTO-ENTMCNC: 32.3 G/DL (ref 31.5–36.5)
MCV RBC AUTO: 104 FL (ref 78–100)
NITRATE UR QL: NEGATIVE
NON-SQ EPI CELLS #/AREA URNS LPF: ABNORMAL /LPF
PH UR STRIP: 5.5 PH (ref 5–7)
PLATELET # BLD AUTO: 235 10E9/L (ref 150–450)
RBC # BLD AUTO: 4.21 10E12/L (ref 3.8–5.2)
RBC #/AREA URNS AUTO: ABNORMAL /HPF
SOURCE: ABNORMAL
SP GR UR STRIP: 1.01 (ref 1–1.03)
UROBILINOGEN UR STRIP-ACNC: 0.2 EU/DL (ref 0.2–1)
WBC # BLD AUTO: 8.1 10E9/L (ref 4–11)
WBC #/AREA URNS AUTO: ABNORMAL /HPF

## 2019-11-06 PROCEDURE — 81001 URINALYSIS AUTO W/SCOPE: CPT | Performed by: PHYSICIAN ASSISTANT

## 2019-11-06 PROCEDURE — 74019 RADEX ABDOMEN 2 VIEWS: CPT

## 2019-11-06 PROCEDURE — 81025 URINE PREGNANCY TEST: CPT | Performed by: PHYSICIAN ASSISTANT

## 2019-11-06 PROCEDURE — 85027 COMPLETE CBC AUTOMATED: CPT | Performed by: PHYSICIAN ASSISTANT

## 2019-11-06 PROCEDURE — 83001 ASSAY OF GONADOTROPIN (FSH): CPT | Performed by: PHYSICIAN ASSISTANT

## 2019-11-06 PROCEDURE — 99214 OFFICE O/P EST MOD 30 MIN: CPT | Performed by: PHYSICIAN ASSISTANT

## 2019-11-06 PROCEDURE — 36415 COLL VENOUS BLD VENIPUNCTURE: CPT | Performed by: PHYSICIAN ASSISTANT

## 2019-11-06 PROCEDURE — 87086 URINE CULTURE/COLONY COUNT: CPT | Performed by: PHYSICIAN ASSISTANT

## 2019-11-06 RX ORDER — LITHIUM CARBONATE 300 MG/1
300 TABLET, FILM COATED, EXTENDED RELEASE ORAL 2 TIMES DAILY
COMMUNITY
Start: 2019-04-18

## 2019-11-06 ASSESSMENT — PAIN SCALES - GENERAL: PAINLEVEL: NO PAIN (0)

## 2019-11-06 ASSESSMENT — MIFFLIN-ST. JEOR: SCORE: 1302.3

## 2019-11-06 NOTE — PROGRESS NOTES
Subjective     Denilson Sweet is a 48 year old female who presents to clinic today for the following health issues:    HPI       1. Flank pain-only present when lays on right side     Onset: 7-10 days     Description:   Character: sharp  Location: right flank  Radiation: None    Intensity: moderate, lasts a few seconds until patient changes position    Progression of Symptoms:  Intermittent - only occurs when lying down/lying on R side/leaning toward R side while sitting    Accompanying Signs & Symptoms:  Fever/Chills?: no   Gas/Bloating: no   Nausea: no   Vomitting: no   Diarrhea?: no   Constipation:YES  Dysuria or Hematuria: no    History:   Trauma: no   Previous similar pain: no    Previous tests done: stigmoid    Precipitating factors:   Does the pain change with:     Food: no      BM: no     Urination: no     Alleviating factors:  Moving from pain     Therapies Tried and outcome: None    LMP:  10/23/19     2.  Postmenopausal bleeding  Patient  had no period for 2 years and suddenly she started to get her period again 3-4 months ago. Period comes monthly. Patient has been off oral contraception for 2 years.   New medication is Lithium that she started in March 2019.    Patient Active Problem List   Diagnosis     Genital herpes     CARDIOVASCULAR SCREENING; LDL GOAL LESS THAN 160     Bipolar 1 disorder (H)     Migraine     Closed displaced fracture of head of right radius, initial encounter     Past Surgical History:   Procedure Laterality Date     BIOPSY BREAST      RT     LAPAROSCOPY PROCEDURE UNLISTED      ABLATION       Social History     Tobacco Use     Smoking status: Never Smoker     Smokeless tobacco: Never Used   Substance Use Topics     Alcohol use: No     Family History   Problem Relation Age of Onset     Osteoporosis Mother      Cancer - colorectal Paternal Grandmother          Current Outpatient Medications   Medication Sig Dispense Refill     CALCIUM 600 + D OR None Entered       lithium ER  "(LITHOBID/ESKALITH CR) 300 MG CR tablet Take 300 mg by mouth 4 times daily       LORazepam (ATIVAN) 0.5 MG tablet Take 1 tablet by mouth 2 times daily as needed.       Multiple Vitamin (MULTI-VITAMIN PO) Take  by mouth.       OLANZapine (ZYPREXA) 20 MG tablet Take 2 tablets by mouth At Bedtime 5 MG daily       Omega-3 Fatty Acids (FISH OIL PO) Take  by mouth. Pt unsure dose       tamsulosin (FLOMAX) 0.4 MG capsule Take 2 capsules (0.8 mg) by mouth daily 30 capsule 0     zolpidem (AMBIEN) 5 MG tablet Take by mouth nightly as needed for sleep       Allergies   Allergen Reactions     Valproic Acid Rash     Lamotrigine      PN: Anxiety, mood swings, manic symptoms        Reviewed and updated as needed this visit by Provider  Tobacco  Allergies  Meds  Problems  Med Hx  Surg Hx  Fam Hx       Review of Systems   ROS COMP: Constitutional, HEENT, cardiovascular, pulmonary, gi and gu systems are negative, except as otherwise noted.      Objective    /74 (BP Location: Right arm, Patient Position: Sitting, Cuff Size: Adult Regular)   Pulse 74   Temp 97.9  F (36.6  C) (Oral)   Resp 16   Ht 1.66 m (5' 5.35\")   Wt 67.1 kg (147 lb 14.4 oz)   SpO2 99%   BMI 24.35 kg/m    Body mass index is 24.35 kg/m .  Physical Exam   GENERAL: healthy, alert and no distress  NECK: no adenopathy, no asymmetry, masses, or scars and thyroid normal to palpation  RESP: lungs clear to auscultation - no rales, rhonchi or wheezes  CV: regular rate and rhythm, normal S1 S2, no S3 or S4, no murmur, click or rub, no peripheral edema and peripheral pulses strong  ABDOMEN: soft, nontender, no hepatosplenomegaly, no masses and bowel sounds normal  MS: no gross musculoskeletal defects noted, no edema  BACK: no CVA tenderness, no paralumbar tenderness  PSYCH: mentation appears normal, affect normal/bright  Diagnostic Test Results:  Results for orders placed or performed in visit on 11/06/19   UA with Microscopic     Status: Abnormal   Result " Value Ref Range    Color Urine Yellow     Appearance Urine Clear     Glucose Urine Negative NEG^Negative mg/dL    Bilirubin Urine Negative NEG^Negative    Ketones Urine Negative NEG^Negative mg/dL    Specific Gravity Urine 1.010 1.003 - 1.035    pH Urine 5.5 5.0 - 7.0 pH    Protein Albumin Urine Negative NEG^Negative mg/dL    Urobilinogen Urine 0.2 0.2 - 1.0 EU/dL    Nitrite Urine Negative NEG^Negative    Blood Urine Trace (A) NEG^Negative    Leukocyte Esterase Urine Negative NEG^Negative    Source Midstream Urine     WBC Urine 0 - 5 OTO5^0 - 5 /HPF    RBC Urine O - 2 OTO2^O - 2 /HPF    Squamous Epithelial /LPF Urine Few FEW^Few /LPF    Bacteria Urine Few (A) NEG^Negative /HPF   HCG Qual, Urine ([id number])     Status: None   Result Value Ref Range    HCG Qual Urine Negative NEG^Negative   Follicle stimulating hormone     Status: None   Result Value Ref Range    FSH 23.1 IU/L   CBC with platelets     Status: Abnormal   Result Value Ref Range    WBC 8.1 4.0 - 11.0 10e9/L    RBC Count 4.21 3.8 - 5.2 10e12/L    Hemoglobin 14.1 11.7 - 15.7 g/dL    Hematocrit 43.7 35.0 - 47.0 %     (H) 78 - 100 fl    MCH 33.5 (H) 26.5 - 33.0 pg    MCHC 32.3 31.5 - 36.5 g/dL    RDW 12.0 10.0 - 15.0 %    Platelet Count 235 150 - 450 10e9/L   Urine Culture Aerobic Bacterial     Status: None (Preliminary result)   Result Value Ref Range    Specimen Description Midstream Urine     Culture Micro       Referred to [facility], await final report.     Xray - independent read-no obvious renal or ureteral calculi, normal bowel gas pattern.         Assessment & Plan       ICD-10-CM    1. Flank pain R10.9 UA with Microscopic     HCG Qual, Urine ([id number])     XR KUB     CBC with platelets     Urine Culture Aerobic Bacterial     CT Abdomen Pelvis w/o Contrast   2. Postmenopausal bleeding N95.0 US Pelvic Complete w Transvaginal     Follicle stimulating hormone     patient is completely asymptomatic at this  time.  The pain she described is most likely musculoskeletal    order for CT was placed in case pain return, patient will need to get it done to rule out obstructing stone  Patient is traveling to [place] tomorrow, if develops pain there she will proceed to ED in [place].     Follow up after the vacation to have pelvic US done to rule out endometrial hyperthrophy        Return in about 10 days (around 11/16/2019).    Lisa Ennis PA-C  [facility]

## 2019-11-06 NOTE — PATIENT INSTRUCTIONS
Please call [facility] at [phone] to schedule your appointment for pelvic US     Please call  Radiology at [phone] to schedule your appointment for CT

## 2019-11-07 LAB
BACTERIA SPEC CULT: NORMAL
SPECIMEN SOURCE: NORMAL

## 2020-02-08 ENCOUNTER — HEALTH MAINTENANCE LETTER (OUTPATIENT)
Age: 50
End: 2020-02-08

## 2020-02-13 ENCOUNTER — TELEPHONE (OUTPATIENT)
Dept: FAMILY MEDICINE | Facility: CLINIC | Age: 50
End: 2020-02-13

## 2020-02-13 NOTE — TELEPHONE ENCOUNTER
This writer attempted to contact Denilson on 02/13/20      Reason for call triage and left message.      If patient calls back:   Registered Nurse called. Follow Triage Call workflow        Aster Peck RN

## 2020-02-13 NOTE — TELEPHONE ENCOUNTER
Reason for Call:  Other     Detailed comments: Patient is feeling, under the weather, and would like to talk to a nurse of possible having the flu or cold.    Phone Number Patient can be reached at: Cell number on file:    Telephone Information:   Mobile [phone]       Best Time: any    Can we leave a detailed message on this number? YES    Call taken on 2/13/2020 at 1:12 PM by Minnie Santana

## 2020-02-14 NOTE — TELEPHONE ENCOUNTER
Took call from RN line.    Patient reporting symptoms, wanted to discuss.    Started on Tuesday evening, not feeling well.  Had episode of nausea and vomiting on Wednesday evening, no more since  Productive cough, stuffy headed, headaches, chills, some body aches  Denies any fevers but does get sweaty  Denies SOB, trouble breathing or being winded with exertion, wheezing. No history or asthma or lung related issues.  Denies sinus pain or pressure  Mucus is yellowish  Denies sore throat or trouble or pain with swallowing  Has not been around anyone sick recently or anyone diagnosed with influenza, that she is aware of  Only taking IBU for her headaches, no other illness medications tried at this time.    Does report some symptoms of possible influenza but currently does not have fever, no recent confirmed exposure and onset was more than 48 hours ago so would not qualify for Tamiflu.  Likely viral illness, home care therapies recommended. Monitor and be seen if worsening/progressing.  OTC Mucinex, cold/cough medications, Sudafed, saline nasal spray, steam showers, increase water intake, lots of rest, healthy diet, persistent hand washing while ill, covering cough, limit exposure to others and in public places. Informed should be improving with time, may take 1-2 weeks till symptoms resolve, if viral. Advised to be seen with any worsening of symptoms.  Patient agreed with suggestions and plan.    Nikky Wilder RN  [facility]

## 2020-02-14 NOTE — TELEPHONE ENCOUNTER
This writer attempted to contact patient on 02/14/20      Reason for call Triage and left message.      If patient calls back:   Registered Nurse called. Follow Triage Call workflow        Katlin Ackerman RN

## 2020-02-14 NOTE — TELEPHONE ENCOUNTER
Pt returned call    Best number to reach caller: Home number on file [phone] (home)    Is it ok to leave a detailed message: sending to RN line

## 2020-06-30 ENCOUNTER — VIRTUAL VISIT (OUTPATIENT)
Dept: FAMILY MEDICINE | Facility: OTHER | Age: 50
End: 2020-06-30

## 2020-06-30 NOTE — PROGRESS NOTES
"Date: 2020 11:50:56  Clinician: Katelyn Castaneda  Clinician NPI: [id number]  Patient: Denilson Sweet  Patient : 1970  Patient Address: [address] [place] [id number]  Patient Phone: [phone]  Visit Protocol: URI  Patient Summary:  Denilson is a 49 year old ( : 1970 ) female who initiated a Visit for COVID-19 (Coronavirus) evaluation and screening. When asked the question \"Please sign me up to receive news, health information and promotions. \", Denilson responded \"No\".    Denilson states her symptoms started gradually 3-4 days ago. After her symptoms started, they improved and then got worse again.   Her symptoms consist of malaise and a headache.   Symptom details   Headache: She states the headache is severe (7-9 on a 10 point pain scale).    Denilson denies having wheezing, nausea, teeth pain, ageusia, diarrhea, vomiting, rhinitis, ear pain, chills, sore throat, enlarged lymph nodes, myalgias, anosmia, facial pain or pressure, fever, cough, and nasal congestion. She also denies having recent facial or sinus surgery in the past 60 days, taking antibiotic medication in the past month, and having a sinus infection within the past year. She is not experiencing dyspnea.    Pertinent COVID-19 (Coronavirus) information  In the past 14 days, Denilson has not worked in a congregate living setting.   She does not work or volunteer as healthcare worker or a  and does not work or volunteer in a healthcare facility.   Denilson also has not lived in a congregate living setting in the past 14 days. She does not live with a healthcare worker.   Denilson has had a close contact with a laboratory-confirmed COVID-19 patient within 14 days of symptom onset. Additional information about contact with COVID-19 (Coronavirus) patient as reported by the patient (free text): I gave my nephew cony, and then he was diagnosed with COVID-19 less than a week later.   Pertinent medical history  " Denilson does not get yeast infections when she takes antibiotics.   Denilson does not need a return to work/school note.   Weight: 145 lbs   Denilson does not smoke or use smokeless tobacco.   Weight: 145 lbs    MEDICATIONS: lithium carbonate oral, Ambien oral, ALLERGIES: NKDA  Clinician Response:  Dear Denilson,   Your symptoms show that you may have coronavirus (COVID-19). This illness can cause fever, cough and trouble breathing. Many people get a mild case and get better on their own. Some people can get very sick.  What should I do?  We would like to test you for this virus.   1. Please call [phone] to schedule your visit. Explain that you were referred by [facility] to have a COVID-19 test. Be ready to [facility].  The following will serve as your written order for this COVID Test, ordered by me, for the indication of suspected COVID [Z20.828]: The test will be ordered in [employer], our electronic health record, after you are scheduled. It will show as ordered and authorized by Christian Acevedo MD.  Order: COVID-19 (Coronavirus) PCR for SYMPTOMATIC testing from [facility].      2. When it's time for your COVID test:  Stay at least 6 feet away from others. (If someone will drive you to your test, stay in the backseat, as far away from the  as you can.)   Cover your mouth and nose with a mask, tissue or washcloth.  Go straight to the testing site. Don't make any stops on the way there or back.      3.Starting now: Stay home and away from others (self-isolate) until:   You've had no fever---and no medicine that reduces fever---for 3 full days (72 hours). And...   Your other symptoms have gotten better. For example, your cough or breathing has improved. And...   At least 10 days have passed since your symptoms started.       During this time, don't leave the house except for testing or medical care.   Stay in your own room, even for meals. Use your own bathroom if you can.   Stay away from others  "in your home. No hugging, kissing or shaking hands. No visitors.  Don't go to work, school or anywhere else.    Clean \"high touch\" surfaces often (doorknobs, counters, handles, etc.). Use a household cleaning spray or wipes. You'll find a full list of  on the EPA website: www.epa.gov/pesticide-registration/list-n-disinfectants-use-against-sars-cov-2.   Cover your mouth and nose with a mask, tissue or washcloth to avoid spreading germs.  Wash your hands and face often. Use soap and water.  Caregivers in these groups are at risk for severe illness due to COVID-19:  o People 65 years and older  o People who live in a nursing home or long-term care facility  o People with chronic disease (lung, heart, cancer, diabetes, kidney, liver, immunologic)  o People who have a weakened immune system, including those who:   Are in cancer treatment  Take medicine that weakens the immune system, such as corticosteroids  Had a bone marrow or organ transplant  Have an immune deficiency  Have poorly controlled HIV or AIDS  Are obese (body mass index of 40 or higher)  Smoke regularly   o Caregivers should wear gloves while washing dishes, handling laundry and cleaning bedrooms and bathrooms.  o Use caution when washing and drying laundry: Don't shake dirty laundry, and use the warmest water setting that you can.  o For more tips, go to www.cdc.gov/coronavirus/2019-ncov/downloads/10Things.pdf.      How can I take care of myself?   Get lots of rest. Drink extra fluids (unless a doctor has told you not to).   Take Tylenol (acetaminophen) for fever or pain. If you have liver or kidney problems, ask your family doctor if it's okay to take Tylenol.   Adults can take either:    650 mg (two 325 mg pills) every 4 to 6 hours, or...   1,000 mg (two 500 mg pills) every 8 hours as needed.    Note: Don't take more than 3,000 mg in one day. Acetaminophen is found in many medicines (both prescribed and over-the-counter medicines). Read all " labels to be sure you don't take too much.   For children, check the Tylenol bottle for the right dose. The dose is based on the child's age or weight.    If you have other health problems (like cancer, heart failure, an organ transplant or severe kidney disease): Call your specialty clinic if you don't feel better in the next 2 days.       Know when to call 911. Emergency warning signs include:    Trouble breathing or shortness of breath Pain or pressure in the chest that doesn't go away Feeling confused like you haven't felt before, or not being able to wake up Bluish-colored lips or face.  Where can I get more information?   [facility] -- About COVID-19: [email or web address]   CDC -- What to Do If You're Sick: www.cdc.gov/coronavirus/2019-ncov/about/steps-when-sick.html   [facility] -- Ending Home Isolation: www.cdc.gov/coronavirus/2019-ncov/hcp/disposition-in-home-patients.html   [facility] -- Caring for Someone: www.cdc.gov/coronavirus/2019-ncov/if-you-are-sick/care-for-someone.html   [facility] -- Interim Guidance for Hospital Discharge to Home: www.[facility].[place]/diseases/coronavirus/hcp/hospdischarge.pdf   [facility] clinical trials (COVID-19 research studies): clinicalaffairs.[facility]-clinical-trials    Below are the COVID-19 hotlines at the [facility] of Health ([facility]). Interpreters are available.    For health questions: Call 224-269-5777 or 1-811.578.1673 (7 a.m. to 7 p.m.) For questions about schools and childcare: Call 325-760-8029 or 1-539.687.5119 (7 a.m. to 7 p.m.)    Diagnosis: Other malaise  Diagnosis ICD: R53.81

## 2020-07-01 DIAGNOSIS — Z20.822 SUSPECTED COVID-19 VIRUS INFECTION: Primary | ICD-10-CM

## 2020-07-01 PROCEDURE — U0003 INFECTIOUS AGENT DETECTION BY NUCLEIC ACID (DNA OR RNA); SEVERE ACUTE RESPIRATORY SYNDROME CORONAVIRUS 2 (SARS-COV-2) (CORONAVIRUS DISEASE [COVID-19]), AMPLIFIED PROBE TECHNIQUE, MAKING USE OF HIGH THROUGHPUT TECHNOLOGIES AS DESCRIBED BY CMS-2020-01-R: HCPCS | Performed by: FAMILY MEDICINE

## 2020-07-01 NOTE — LETTER
July 4, 2020        Denilson KNUTSON Kee  [address] [place] [id number]    This letter provides a written record that you were tested for COVID-19 on 7/1/20.       Your result was negative. This means that we didn t find the virus that causes COVID-19 in your sample. A test may show negative when you do actually have the virus. This can happen when the virus is in the early stages of infection, before you feel illness symptoms.    If you have symptoms   Stay home and away from others (self-isolate) until you meet ALL of the guidelines below:    You ve had no fever--and no medicine that reduces fever--for 3 full days (72 hours). And      Your other symptoms have gotten better. For example, your cough or breathing has improved. And     At least 10 days have passed since your symptoms started.    During this time:    Stay home. Don t go to work, school or anywhere else.     Stay in your own room, including for meals. Use your own bathroom if you can.    Stay away from others in your home. No hugging, kissing or shaking hands. No visitors.    Clean  high touch  surfaces often (doorknobs, counters, handles, etc.). Use a household cleaning spray or wipes. You can find a full list on the EPA website at www.epa.gov/pesticide-registration/list-n-disinfectants-use-against-sars-cov-2.    Cover your mouth and nose with a mask, tissue or washcloth to avoid spreading germs.    Wash your hands and face often with soap and water.    Going back to work  Check with your employer for any guidelines to follow for going back to work.    Employers: This document serves as formal notice that your employee tested negative for COVID-19, as of the testing date shown above.     No

## 2020-07-02 LAB
SARS-COV-2 RNA SPEC QL NAA+PROBE: NOT DETECTED
SPECIMEN SOURCE: NORMAL

## 2020-09-15 ENCOUNTER — OFFICE VISIT (OUTPATIENT)
Dept: FAMILY MEDICINE | Facility: CLINIC | Age: 50
End: 2020-09-15
Payer: MEDICARE

## 2020-09-15 VITALS
TEMPERATURE: 98.8 F | WEIGHT: 154 LBS | HEIGHT: 65 IN | OXYGEN SATURATION: 99 % | SYSTOLIC BLOOD PRESSURE: 101 MMHG | DIASTOLIC BLOOD PRESSURE: 68 MMHG | HEART RATE: 67 BPM | BODY MASS INDEX: 25.66 KG/M2 | RESPIRATION RATE: 18 BRPM

## 2020-09-15 DIAGNOSIS — Z00.00 ENCOUNTER FOR MEDICARE ANNUAL WELLNESS EXAM: Primary | ICD-10-CM

## 2020-09-15 DIAGNOSIS — Z12.31 ENCOUNTER FOR SCREENING MAMMOGRAM FOR BREAST CANCER: ICD-10-CM

## 2020-09-15 DIAGNOSIS — N95.9 PREMENOPAUSAL PATIENT: ICD-10-CM

## 2020-09-15 DIAGNOSIS — G43.009 MIGRAINE WITHOUT AURA AND WITHOUT STATUS MIGRAINOSUS, NOT INTRACTABLE: ICD-10-CM

## 2020-09-15 DIAGNOSIS — Z13.1 SCREENING FOR DIABETES MELLITUS: ICD-10-CM

## 2020-09-15 DIAGNOSIS — Z12.11 SCREEN FOR COLON CANCER: ICD-10-CM

## 2020-09-15 PROCEDURE — G0438 PPPS, INITIAL VISIT: HCPCS | Performed by: PHYSICIAN ASSISTANT

## 2020-09-15 ASSESSMENT — MIFFLIN-ST. JEOR: SCORE: 1319.42

## 2020-09-15 ASSESSMENT — PAIN SCALES - GENERAL: PAINLEVEL: NO PAIN (0)

## 2020-09-15 NOTE — PROGRESS NOTES
"  SUBJECTIVE:   Denilson Sweet is a 49 year old female who presents for Preventive Visit.      Patient has been advised of split billing requirements and indicates understanding: Yes  Are you in the first 12 months of your Medicare Part B coverage?  No    Physical Health:    In general, how would you rate your overall physical health? good    Outside of work, how many days during the week do you exercise? 6-7 days/week    Outside of work, approximately how many minutes a day do you exercise?greater than 60 minutes    If you drink alcohol do you typically have >3 drinks per day or >7 drinks per week? No    Do you usually eat at least 4 servings of fruit and vegetables a day, include whole grains & fiber and avoid regularly eating high fat or \"junk\" foods? Yes    Do you have any problems taking medications regularly?  No    Do you have any side effects from medications? none    Needs assistance for the following daily activities: no assistance needed    Which of the following safety concerns are present in your home?  none identified     Hearing impairment: No    In the past 6 months, have you been bothered by leaking of urine? no    Mental Health:    In general, how would you rate your overall mental or emotional health? good  PHQ-2 Score:      Do you feel safe in your environment? Yes    Have you ever done Advance Care Planning? (For example, a Health Directive, POLST, or a discussion with a medical provider or your loved ones about your wishes): No, advance care planning information given to patient to review.  Patient plans to discuss their wishes with loved ones or provider.      Additional concerns to address?  YES  Could be going through menopause. Has mild Hot flashes  Also patient gets migraines 2-3 times a month and they last 2 days.  No N/V. Has long history of migraine headache. No prophylactic medication. Does not want tritan as tried in the past and did not like the effects. Excedrin helps with " migraine.   Would like a colon cancer screening due to grandmother passing away from it.    Gets frequent headaches 2-3 times a month that last 2-3 days    Fall risk:  Fallen 2 or more times in the past year?: No  Any fall with injury in the past year?: No  click delete button to remove this line now  Cognitive Screenin) Repeat 3 items (Leader, Season, Table)    2) Clock draw: NORMAL  3) 3 item recall: Recalls 3 objects  Results: 3 items recalled: COGNITIVE IMPAIRMENT LESS LIKELY    Mini-CogTM Copyright S [employer]. Licensed by the author for use in [facility] [employer]; reprinted with permission (camilo@[email or web address]). All rights reserved.      Do you have sleep apnea, excessive snoring or daytime drowsiness?: no  Reviewed and updated as needed this visit by clinical staff  Tobacco  Allergies  Meds  Problems  Med Hx  Surg Hx  Fam Hx  Soc Hx          Reviewed and updated as needed this visit by Provider  Tobacco  Allergies  Meds  Problems  Med Hx  Surg Hx  Fam Hx        Social History     Tobacco Use     Smoking status: Never Smoker     Smokeless tobacco: Never Used   Substance Use Topics     Alcohol use: No                           Current providers sharing in care for this patient include:   Patient Care Team:  Lisa Ennis PA-C as PCP - General (Physician Assistant)  Lisa Ennis PA-C as Assigned PCP    The following health maintenance items are reviewed in Epic and correct as of today:  Health Maintenance   Topic Date Due     MIGRAINE ACTION PLAN  1969     COLORECTAL CANCER SCREENING  1979     HIV SCREENING  1984     MEDICARE ANNUAL WELLNESS VISIT  2019     LIPID  10/24/2019     PHQ-2  2020     MAMMO SCREENING  2020     INFLUENZA VACCINE (1) 2020     ZOSTER IMMUNIZATION (1 of 2) 2019     HPV TEST  2023     PAP  2023     DTAP/TDAP/TD IMMUNIZATION (3 - Td) 2027     IPV IMMUNIZATION  Aged Out      "MENINGITIS IMMUNIZATION  Aged Out     HEPATITIS B IMMUNIZATION  Aged Out     Patient Active Problem List   Diagnosis     Genital herpes     CARDIOVASCULAR SCREENING; LDL GOAL LESS THAN 160     Bipolar 1 disorder (H)     Migraine     Closed displaced fracture of head of right radius, initial encounter     Past Surgical History:   Procedure Laterality Date     BIOPSY BREAST      RT     LAPAROSCOPY PROCEDURE UNLISTED      ABLATION       Social History     Tobacco Use     Smoking status: Never Smoker     Smokeless tobacco: Never Used   Substance Use Topics     Alcohol use: No     Family History   Problem Relation Age of Onset     Osteoporosis Mother      Cancer - colorectal Paternal Grandmother          Current Outpatient Medications   Medication Sig Dispense Refill     CALCIUM 600 + D OR None Entered       lithium ER (LITHOBID/ESKALITH CR) 300 MG CR tablet Take 300 mg by mouth 2 times daily        LORazepam (ATIVAN) 0.5 MG tablet Take 1 tablet by mouth 2 times daily as needed.       Multiple Vitamin (MULTI-VITAMIN PO) Take  by mouth.       OLANZapine (ZYPREXA) 20 MG tablet Take 2 tablets by mouth At Bedtime 5 MG daily       Omega-3 Fatty Acids (FISH OIL PO) Take  by mouth. Pt unsure dose       zolpidem (AMBIEN) 5 MG tablet Take 10 mg by mouth nightly as needed for sleep        Allergies   Allergen Reactions     Valproic Acid Rash     Lamotrigine      PN: Anxiety, mood swings, manic symptoms     Mammogram Screening: Mammogram Screening: Patient over age 50, mutual decision to screen reflected in health maintenance.    ROS:  Constitutional, HEENT, cardiovascular, pulmonary, gi and gu systems are negative, except as otherwise noted.    OBJECTIVE:   /68 (BP Location: Left arm, Patient Position: Sitting, Cuff Size: Adult Regular)   Pulse 67   Temp 98.8  F (37.1  C) (Oral)   Resp 18   Ht 1.651 m (5' 5\")   Wt 69.9 kg (154 lb)   SpO2 99%   BMI 25.63 kg/m   Estimated body mass index is 25.63 kg/m  as calculated " "from the following:    Height as of this encounter: 1.651 m (5' 5\").    Weight as of this encounter: 69.9 kg (154 lb).  EXAM:   GENERAL APPEARANCE: healthy, alert and no distress  EYES: Eyes grossly normal to inspection, PERRL and conjunctivae and sclerae normal  HENT: ear canals and TM's normal, nose and mouth without ulcers or lesions, oropharynx clear and oral mucous membranes moist  NECK: no adenopathy, no asymmetry, masses, or scars and thyroid normal to palpation  RESP: lungs clear to auscultation - no rales, rhonchi or wheezes  BREAST: normal without masses, tenderness or nipple discharge and no palpable axillary masses or adenopathy  CV: regular rate and rhythm, normal S1 S2, no S3 or S4, no murmur, click or rub, no peripheral edema and peripheral pulses strong  ABDOMEN: soft, nontender, no hepatosplenomegaly, no masses and bowel sounds normal  MS: no musculoskeletal defects are noted and gait is age appropriate without ataxia  SKIN: no suspicious lesions or rashes  NEURO: Normal strength and tone, sensory exam grossly normal, mentation intact and speech normal  PSYCH: mentation appears normal and affect normal/bright    Diagnostic Test Results:  Labs reviewed in Epic    ASSESSMENT / PLAN:       ICD-10-CM    1. Encounter for Medicare annual wellness exam  Z00.00 Lipid panel reflex to direct LDL Fasting   2. Migraine without aura and without status migrainosus, not intractable  G43.009    3. Premenopausal patient  N95.9 Follicle stimulating hormone     CANCELED: Follicle stimulating hormone   4. Screen for colon cancer  Z12.11 GASTROENTEROLOGY ADULT REF PROCEDURE ONLY   5. Encounter for screening mammogram for breast cancer  Z12.31 MA SCREENING DIGITAL BILAT - Future  (s+30)   6. Screening for diabetes mellitus  Z13.1 **Glucose FUTURE anytime     2. Discussed Amitriptyline and propranolol as a preventative medication, but patient declined to start anything at this time.       Patient has been advised of split " "billing requirements and indicates understanding: Yes    COUNSELING:  Reviewed preventive health counseling, as reflected in patient instructions       Regular exercise       Healthy diet/nutrition       Immunizations    Declined: Influenza                Colon cancer screening    Estimated body mass index is 25.63 kg/m  as calculated from the following:    Height as of this encounter: 1.651 m (5' 5\").    Weight as of this encounter: 69.9 kg (154 lb).    Weight management plan: Discussed healthy diet and exercise guidelines    She reports that she has never smoked. She has never used smokeless tobacco.    Appropriate preventive services were discussed with this patient, including applicable screening as appropriate for cardiovascular disease, diabetes, osteopenia/osteoporosis, and glaucoma.  As appropriate for age/gender, discussed screening for colorectal cancer, prostate cancer, breast cancer, and cervical cancer. Checklist reviewing preventive services available has been given to the patient.    Reviewed patients plan of care and provided an AVS. The Basic Care Plan (routine screening as documented in Health Maintenance) for Denilson meets the Care Plan requirement. This Care Plan has been established and reviewed with the Patient.    Counseling Resources:  ATP IV Guidelines  Pooled Cohorts Equation Calculator  Breast Cancer Risk Calculator  BRCA-Related Cancer Risk Assessment: FHS-7 Tool  FRAX Risk Assessment  ICSI Preventive Guidelines  Dietary Guidelines for Americans, 2010  USDA's MyPlate  ASA Prophylaxis  Lung CA Screening    Lisa Ennis PA-C  [facility]  "

## 2020-09-15 NOTE — PATIENT INSTRUCTIONS
Patient Education   Personalized Prevention Plan  You are due for the preventive services outlined below.  Your care team is available to assist you in scheduling these services.  If you have already completed any of these items, please share that information with your care team to update in your medical record.  Health Maintenance Due   Topic Date Due     Migraine Action Plan  1970     Colorectal Cancer Screening  11/30/1979     HIV Screening  11/30/1984     Annual Wellness Visit  01/25/2019     Cholesterol Lab  10/24/2019     PHQ-2  01/01/2020     Mammogram  07/13/2020     Flu Vaccine (1) 09/01/2020     Zoster (Shingles) Vaccine (1 of 2) 11/30/2019

## 2020-09-23 ENCOUNTER — HOSPITAL ENCOUNTER (OUTPATIENT)
Facility: AMBULATORY SURGERY CENTER | Age: 50
End: 2020-09-23
Attending: SURGERY | Admitting: SURGERY
Payer: MEDICARE

## 2020-09-23 ENCOUNTER — TELEPHONE (OUTPATIENT)
Dept: SURGERY | Facility: CLINIC | Age: 50
End: 2020-09-23

## 2020-09-23 DIAGNOSIS — Z11.59 ENCOUNTER FOR SCREENING FOR OTHER VIRAL DISEASES: Primary | ICD-10-CM

## 2020-09-23 DIAGNOSIS — Z12.11 SCREENING FOR COLON CANCER: Primary | ICD-10-CM

## 2020-09-23 NOTE — TELEPHONE ENCOUNTER
Location: [facility]  Is this a cancellation or reschedule?  no  The name of the procedure: Colonoscopy  Provider scheduled with: Jim  Date Mon Nov 9,2020 , Time 12:30 pm

## 2020-11-02 RX ORDER — BISACODYL 5 MG
5 TABLET, DELAYED RELEASE (ENTERIC COATED) ORAL SEE ADMIN INSTRUCTIONS
Qty: 1 TABLET | Refills: 0 | Status: SHIPPED | OUTPATIENT
Start: 2020-11-02 | End: 2020-11-02 | Stop reason: HOSPADM

## 2020-11-02 RX ORDER — SODIUM, POTASSIUM,MAG SULFATES 17.5-3.13G
1 SOLUTION, RECONSTITUTED, ORAL ORAL SEE ADMIN INSTRUCTIONS
Qty: 2 BOTTLE | Refills: 0 | Status: SHIPPED | OUTPATIENT
Start: 2020-11-02 | End: 2020-11-02 | Stop reason: HOSPADM

## 2020-11-07 ENCOUNTER — HEALTH MAINTENANCE LETTER (OUTPATIENT)
Age: 50
End: 2020-11-07

## 2020-11-10 ENCOUNTER — ANCILLARY PROCEDURE (OUTPATIENT)
Dept: MAMMOGRAPHY | Facility: CLINIC | Age: 50
End: 2020-11-10
Attending: PHYSICIAN ASSISTANT
Payer: MEDICARE

## 2020-11-10 DIAGNOSIS — Z12.31 ENCOUNTER FOR SCREENING MAMMOGRAM FOR BREAST CANCER: ICD-10-CM

## 2020-11-10 PROCEDURE — 77067 SCR MAMMO BI INCL CAD: CPT | Mod: TC | Performed by: RADIOLOGY

## 2020-11-11 DIAGNOSIS — Z13.1 SCREENING FOR DIABETES MELLITUS: ICD-10-CM

## 2020-11-11 DIAGNOSIS — Z00.00 ENCOUNTER FOR MEDICARE ANNUAL WELLNESS EXAM: ICD-10-CM

## 2020-11-11 LAB
CHOLEST SERPL-MCNC: 228 MG/DL
GLUCOSE SERPL-MCNC: 92 MG/DL (ref 70–99)
HDLC SERPL-MCNC: 83 MG/DL
LDLC SERPL CALC-MCNC: 127 MG/DL
NONHDLC SERPL-MCNC: 145 MG/DL
TRIGL SERPL-MCNC: 90 MG/DL

## 2020-11-11 PROCEDURE — 80061 LIPID PANEL: CPT | Performed by: PHYSICIAN ASSISTANT

## 2020-11-11 PROCEDURE — 82947 ASSAY GLUCOSE BLOOD QUANT: CPT | Performed by: PHYSICIAN ASSISTANT

## 2020-11-11 PROCEDURE — 36415 COLL VENOUS BLD VENIPUNCTURE: CPT | Performed by: PHYSICIAN ASSISTANT

## 2021-09-05 ENCOUNTER — HEALTH MAINTENANCE LETTER (OUTPATIENT)
Age: 51
End: 2021-09-05

## 2021-09-15 ENCOUNTER — OFFICE VISIT (OUTPATIENT)
Dept: URGENT CARE | Facility: URGENT CARE | Age: 51
End: 2021-09-15
Payer: MEDICARE

## 2021-09-15 ENCOUNTER — ANCILLARY PROCEDURE (OUTPATIENT)
Dept: GENERAL RADIOLOGY | Facility: CLINIC | Age: 51
End: 2021-09-15
Attending: PHYSICIAN ASSISTANT
Payer: MEDICARE

## 2021-09-15 VITALS
TEMPERATURE: 97.7 F | DIASTOLIC BLOOD PRESSURE: 75 MMHG | SYSTOLIC BLOOD PRESSURE: 114 MMHG | BODY MASS INDEX: 25.13 KG/M2 | WEIGHT: 151 LBS | HEART RATE: 64 BPM | OXYGEN SATURATION: 98 % | RESPIRATION RATE: 20 BRPM

## 2021-09-15 DIAGNOSIS — R05.9 COUGH: ICD-10-CM

## 2021-09-15 DIAGNOSIS — G44.219 EPISODIC TENSION-TYPE HEADACHE, NOT INTRACTABLE: ICD-10-CM

## 2021-09-15 DIAGNOSIS — R05.9 COUGH: Primary | ICD-10-CM

## 2021-09-15 DIAGNOSIS — J20.9 ACUTE BRONCHITIS WITH SYMPTOMS > 10 DAYS: ICD-10-CM

## 2021-09-15 PROCEDURE — 99213 OFFICE O/P EST LOW 20 MIN: CPT | Performed by: PHYSICIAN ASSISTANT

## 2021-09-15 PROCEDURE — 71046 X-RAY EXAM CHEST 2 VIEWS: CPT | Performed by: RADIOLOGY

## 2021-09-15 RX ORDER — AZITHROMYCIN 250 MG/1
TABLET, FILM COATED ORAL
Qty: 6 TABLET | Refills: 0 | Status: SHIPPED | OUTPATIENT
Start: 2021-09-15 | End: 2021-12-15

## 2021-09-15 ASSESSMENT — ENCOUNTER SYMPTOMS
SORE THROAT: 0
WOUND: 0
HEADACHES: 0
WEAKNESS: 0
ENDOCRINE NEGATIVE: 1
ALLERGIC/IMMUNOLOGIC NEGATIVE: 1
EYES NEGATIVE: 1
DIZZINESS: 0
MYALGIAS: 0
VOMITING: 0
JOINT SWELLING: 0
COUGH: 1
DIARRHEA: 0
BACK PAIN: 0
RHINORRHEA: 0
NAUSEA: 0
CHILLS: 0
PALPITATIONS: 0
FEVER: 0
NECK PAIN: 0
SHORTNESS OF BREATH: 0
LIGHT-HEADEDNESS: 0
NECK STIFFNESS: 0
ARTHRALGIAS: 0
MUSCULOSKELETAL NEGATIVE: 1
CARDIOVASCULAR NEGATIVE: 1

## 2021-09-15 NOTE — PATIENT INSTRUCTIONS
"     Patient Education     Migraine Headache: Stages and Treatment    A migraine headache tends to progress in stages. Learning these stages can help you better understand what is happening. Then you can learn ways to reduce pain and relieve other symptoms. Methods for relieving your symptoms include self-care and medicines.   Migraine stages  Migraines tend to progress through 4 stages. Many people don't have all stages, and stages may differ with each headache:     Prodrome. A few hours to a day or so before the headache, you may feel tired, (yawning many times), uneasy, or romero. You may also feel bloated or crave certain foods.    Aura. Up to an hour before the headache starts, some migraine sufferers experience aura--flashing lights, blind spots, other vision problems, confusion, difficulty speaking, or other neurologic symptoms.    Headache. Moderate to severe pain affects one side of the head and then can spread to both sides, often along with nausea. You may be highly sensitive to light, sound, and odors. Vomiting or diarrhea may also happen. This stage lasts 4 to 72 hours.    Postdrome. After your headache ends, you may feel tired, achy, and \"washed out.\" This may last for a day or so.  Self-care during a migraine  Here is what you can do:    Use a cold compress. Wrap a thin cloth around a cold pack, a cold can of soda, or a bag of frozen vegetables. Apply this to your temple or other pain site.    Drink fluids. If nausea makes it hard to drink, try sucking on ice.    Rest. If possible, lie down. Try not to bend over, as this may increase your pain. Sometimes laying in a dark quiet room can help the migraine from being aggravated.      Try caffeine. Some people find that drinking fluids with caffeine, such as coffee or tea, helps to lessen migraine pain.  Using medicines  Work with your healthcare provider to find the right medicines for you. Medicines for migraine may relieve pain (analgesics), relieve " nausea, or attack the migraine's root causes (migraine-specific medicines).   Rebound headache  Taking analgesics each day, or even several times a week, may lead to more frequent and severe headaches. These are called rebound headaches. If you think you're having rebound headaches, tell your healthcare provider. He or she can help you safely decrease your medicine. Rebound caffeine withdrawal headaches can also happen. Certain medicines are addictive and can cause rebound headaches when discontinued abruptly.   [employer] last reviewed this educational content on 5/1/2018 2000-2021 [employer]. All rights reserved. This information is not intended as a substitute for professional medical care. Always follow your healthcare professional's instructions.           Patient Education     Bronchitis, Antibiotic Treatment (Adult)    Bronchitis is an infection of the air passages (bronchial tubes) in your lungs. It often occurs when you have a cold. This illness is contagious during the first few days and is spread through the air by coughing and sneezing, or by direct contact (touching the sick person and then touching your own eyes, nose, or mouth).  Symptoms of bronchitis include cough with mucus (phlegm) and low-grade fever. Bronchitis usually lasts 7 to 14 days. Mild cases can be treated with simple home remedies. More severe infection is treated with an antibiotic.  Home care  Follow these guidelines when caring for yourself at home:    If your symptoms are severe, rest at home for the first 2 to 3 days. When you go back to your usual activities, don't let yourself get too tired.    Don't smoke. Also stay away from secondhand smoke.    You may use over-the-counter medicines to control fever or pain, unless another medicine was prescribed. If you have chronic liver or kidney disease or have ever had a stomach ulcer or gastrointestinal bleeding, talk with your healthcare provider before using these  medicines. Also talk to your provider if you are taking medicine to prevent blood clots. Aspirin should never be given to anyone younger than 18 who is ill with a viral infection or fever. It may cause severe liver or brain damage.    Your appetite may be low, so a light diet is fine. Stay well hydrated by drinking 6 to 8 glasses of fluids per day. This includes water, soft drinks, sports drinks, juices, tea, or soup. Extra fluids will help loosen mucus in your nose and lungs.    Over-the-counter cough, cold, and sore-throat medicines will not shorten the length of the illness, but they may be helpful to reduce your symptoms. Don't use decongestants if you have high blood pressure.    Finish all antibiotic medicine. Do this even if you are feeling better after only a few days.  Follow-up care  Follow up with your healthcare provider, or as advised. If you had an X-ray or ECG (electrocardiogram), a specialist will review it. You will be told of any new test results that may affect your care.  If you are age 65 or older, if you smoke, or if you have a chronic lung disease or condition that affects your immune system, ask your healthcare provider about getting a pneumococcal vaccine and a yearly flu shot (influenza vaccine).  When to seek medical advice  Call your healthcare provider right away if any of these occur:    Fever of 100.4 F (38 C) or higher, or as directed by your healthcare provider    Coughing up more sputum    Weakness, drowsiness, headache, facial pain, ear pain, or a stiff neck  Call 911  Call 911 if any of these occur.    Coughing up blood    Weakness, drowsiness, headache, or stiff neck that get worse    Trouble breathing, wheezing, or pain with breathing  [employer] last reviewed this educational content on 6/1/2018 2000-2021 [employer]. All rights reserved. This information is not intended as a substitute for professional medical care. Always follow your healthcare professional's  instructions.

## 2021-09-15 NOTE — PROGRESS NOTES
Chief Complaint:     Chief Complaint   Patient presents with     Cough     on and off x 1 year, now it's getting worse, pt states sometimes cough up blood. migraine headaches that last 3-4 days x 2 times a month.        Results for orders placed or performed in visit on 09/15/21   XR Chest 2 Views     Status: None    Narrative    CHEST TWO VIEWS   9/15/2021 12:03 PM     HISTORY:  Worsening cough for two years.     COMPARISON: Chest x-ray 6/27/2011.      Impression    IMPRESSION: PA and lateral views of the chest. Lungs are clear. Heart  is normal in size. No effusions are evident. No pneumothorax.    JENNIFER WELLINGTON MD         SYSTEM ID:  [id number]       Medical Decision Making:    Vital signs reviewed by Patricio Jensen PA-C  /75 (BP Location: Left arm, Patient Position: Sitting, Cuff Size: Adult Regular)   Pulse 64   Temp 97.7  F (36.5  C) (Oral)   Resp 20   Wt 68.5 kg (151 lb)   SpO2 98%   BMI 25.13 kg/m      Differential Diagnosis:  URI Adult/Peds:  Bronchitis-viral, Pneumonia, Viral syndrome and Viral upper respiratory illness, tuberculosis, sarcoidosis        ASSESSMENT    1. Cough    2. Episodic tension-type headache, not intractable    3. Acute bronchitis with symptoms > 10 days        PLAN    Patient is in no acute distress.    Temp is 97.7 in clinic today, lung sounds were clear, and O2 sats at 98% on RA.    CXR was negative for any acute infiltrates or consolidations per my read.  COVID swab declined today.  With length of symptoms, Rx for Zithromax today.  Rest, Push fluids, vaporizer, elevation of head of bed.  Ibuprofen and or Tylenol for any fever or body aches.  Over the counter cough suppressant- PRN- as discussed.   Follow up with PCP regarding migraines, given no headache present today.  If symptoms worsen, recheck immediately otherwise follow up with your PCP in 1 week if symptoms are not improving.  Worrisome symptoms discussed with instructions to go to the ED.  Patient given COVID  isolation instructions.  Patient verbalized understanding and agreed with this plan.    Labs:    Results for orders placed or performed in visit on 09/15/21   XR Chest 2 Views     Status: None    Narrative    CHEST TWO VIEWS   9/15/2021 12:03 PM     HISTORY:  Worsening cough for two years.     COMPARISON: Chest x-ray 6/27/2011.      Impression    IMPRESSION: PA and lateral views of the chest. Lungs are clear. Heart  is normal in size. No effusions are evident. No pneumothorax.    JENNIFER WELLINGTON MD         SYSTEM ID:  [id number]        Vital signs reviewed by Patricio Jensen PA-C  /75 (BP Location: Left arm, Patient Position: Sitting, Cuff Size: Adult Regular)   Pulse 64   Temp 97.7  F (36.5  C) (Oral)   Resp 20   Wt 68.5 kg (151 lb)   SpO2 98%   BMI 25.13 kg/m      Current Meds      Current Outpatient Medications:      azithromycin (ZITHROMAX) 250 MG tablet, Two tablets first day, then one tablet daily for four days., Disp: 6 tablet, Rfl: 0     CALCIUM 600 + D OR, None Entered, Disp: , Rfl:      lithium ER (LITHOBID/ESKALITH CR) 300 MG CR tablet, Take 300 mg by mouth 2 times daily , Disp: , Rfl:      LORazepam (ATIVAN) 0.5 MG tablet, Take 1 tablet by mouth 2 times daily as needed., Disp: , Rfl:      Multiple Vitamin (MULTI-VITAMIN PO), Take  by mouth., Disp: , Rfl:      Omega-3 Fatty Acids (FISH OIL PO), Take  by mouth. Pt unsure dose, Disp: , Rfl:      zolpidem (AMBIEN) 5 MG tablet, Take 10 mg by mouth nightly as needed for sleep , Disp: , Rfl:       Respiratory History    occasional episodes of bronchitis      SUBJECTIVE    HPI: Denilson Sweet is an 50 year old female who presents with chest congestion, cough daytime, nightime, persistent, productive blood tinged and migraine headaches in the left temple that last 3-4 days and occur every other week.  She states she has had an occasional cough for 2 years, but the last 2 weeks has worsened.  There is no shortness of breath, wheezing, chest pain, nausea  and vomiting.  Patient is eating and drinking well.  No fever, nausea, vomiting, or diarrhea.    Patient denies any recent travel or exposure to known COVID positive tested individual.      ROS:     Review of Systems   Constitutional: Negative for chills and fever.   HENT: Negative for congestion, ear pain, rhinorrhea and sore throat.    Eyes: Negative.    Respiratory: Positive for cough. Negative for shortness of breath.    Cardiovascular: Negative.  Negative for chest pain and palpitations.   Gastrointestinal: Negative for diarrhea, nausea and vomiting.   Endocrine: Negative.    Genitourinary: Negative.    Musculoskeletal: Negative.  Negative for arthralgias, back pain, joint swelling, myalgias, neck pain and neck stiffness.   Skin: Negative.  Negative for rash and wound.   Allergic/Immunologic: Negative.  Negative for immunocompromised state.   Neurological: Negative for dizziness, weakness, light-headedness and headaches.         Family History   Family History   Problem Relation Age of Onset     Osteoporosis Mother      Cancer - colorectal Paternal Grandmother         Problem history  Patient Active Problem List   Diagnosis     Genital herpes     CARDIOVASCULAR SCREENING; LDL GOAL LESS THAN 160     Bipolar 1 disorder (H)     Migraine     Closed displaced fracture of head of right radius, initial encounter        Allergies  Allergies   Allergen Reactions     Valproic Acid Rash     Lamotrigine      PN: Anxiety, mood swings, manic symptoms        Social History  Social History     Socioeconomic History     Marital status:      Spouse name: Not on file     Number of children: Not on file     Years of education: Not on file     Highest education level: Not on file   Occupational History     Not on file   Tobacco Use     Smoking status: Never Smoker     Smokeless tobacco: Never Used   Substance and Sexual Activity     Alcohol use: No     Drug use: No     Sexual activity: Yes     Partners: Male     Birth  control/protection: Pill   Other Topics Concern     Parent/sibling w/ CABG, MI or angioplasty before 65F 55M? Not Asked   Social History Narrative     Not on file     Social Determinants of Health     Financial Resource Strain:      Difficulty of Paying Living Expenses:    Food Insecurity:      Worried About Running Out of Food in the Last Year:      Ran Out of Food in the Last Year:    Transportation Needs:      Lack of Transportation (Medical):      Lack of Transportation (Non-Medical):    Physical Activity:      Days of Exercise per Week:      Minutes of Exercise per Session:    Stress:      Feeling of Stress :    Social Connections:      Frequency of Communication with Friends and Family:      Frequency of Social Gatherings with Friends and Family:      Attends Yazidi Services:      Active Member of Clubs or Organizations:      Attends Club or Organization Meetings:      Marital Status:    Intimate Partner Violence:      Fear of Current or Ex-Partner:      Emotionally Abused:      Physically Abused:      Sexually Abused:         OBJECTIVE     Vital signs reviewed by Patricio Jensen PA-C  /75 (BP Location: Left arm, Patient Position: Sitting, Cuff Size: Adult Regular)   Pulse 64   Temp 97.7  F (36.5  C) (Oral)   Resp 20   Wt 68.5 kg (151 lb)   SpO2 98%   BMI 25.13 kg/m       Physical Exam  Vitals and nursing note reviewed.   Constitutional:       General: She is not in acute distress.     Appearance: She is well-developed. She is not ill-appearing, toxic-appearing or diaphoretic.   HENT:      Head: Normocephalic and atraumatic.      Right Ear: Hearing, tympanic membrane, ear canal and external ear normal. Tympanic membrane is not perforated, erythematous, retracted or bulging.      Left Ear: Hearing, tympanic membrane, ear canal and external ear normal. Tympanic membrane is not perforated, erythematous, retracted or bulging.      Nose: No mucosal edema, congestion or rhinorrhea.      Right Sinus:  No maxillary sinus tenderness or frontal sinus tenderness.      Left Sinus: No maxillary sinus tenderness or frontal sinus tenderness.      Mouth/Throat:      Pharynx: No pharyngeal swelling, oropharyngeal exudate, posterior oropharyngeal erythema or uvula swelling.      Tonsils: No tonsillar exudate or tonsillar abscesses. 0 on the right. 0 on the left.   Eyes:      General:         Right eye: No discharge.         Left eye: No discharge.      Pupils: Pupils are equal, round, and reactive to light.   Cardiovascular:      Rate and Rhythm: Normal rate and regular rhythm.      Heart sounds: Normal heart sounds. No murmur heard.   No friction rub. No gallop.    Pulmonary:      Effort: Pulmonary effort is normal. No prolonged expiration or respiratory distress.      Breath sounds: Normal breath sounds. No decreased breath sounds, wheezing, rhonchi or rales.   Chest:      Chest wall: No tenderness.   Abdominal:      General: Bowel sounds are normal. There is no distension.      Palpations: Abdomen is soft. There is no mass.      Tenderness: There is no abdominal tenderness. There is no guarding.   Musculoskeletal:      Cervical back: Normal range of motion and neck supple.   Lymphadenopathy:      Head:      Right side of head: No submental, submandibular, tonsillar, preauricular or posterior auricular adenopathy.      Left side of head: No submental, submandibular, tonsillar, preauricular or posterior auricular adenopathy.      Cervical: No cervical adenopathy.      Right cervical: No superficial or posterior cervical adenopathy.     Left cervical: No superficial or posterior cervical adenopathy.   Skin:     General: Skin is warm and dry.      Findings: No rash.   Neurological:      Mental Status: She is alert and oriented to person, place, and time.      Cranial Nerves: No cranial nerve deficit.      Deep Tendon Reflexes: Reflexes are normal and symmetric.   Psychiatric:         Behavior: Behavior normal. Behavior is  cooperative.         Thought Content: Thought content normal.         Judgment: Judgment normal.           Patricio Jensen PA-C  9/15/2021, 11:47 AM

## 2021-09-22 ENCOUNTER — OFFICE VISIT (OUTPATIENT)
Dept: FAMILY MEDICINE | Facility: CLINIC | Age: 51
End: 2021-09-22
Payer: MEDICARE

## 2021-09-22 ENCOUNTER — TELEPHONE (OUTPATIENT)
Dept: FAMILY MEDICINE | Facility: CLINIC | Age: 51
End: 2021-09-22

## 2021-09-22 VITALS
TEMPERATURE: 98.2 F | HEART RATE: 62 BPM | OXYGEN SATURATION: 96 % | SYSTOLIC BLOOD PRESSURE: 109 MMHG | RESPIRATION RATE: 12 BRPM | DIASTOLIC BLOOD PRESSURE: 65 MMHG | BODY MASS INDEX: 24.59 KG/M2 | HEIGHT: 66 IN | WEIGHT: 153 LBS

## 2021-09-22 DIAGNOSIS — J30.89 NON-SEASONAL ALLERGIC RHINITIS, UNSPECIFIED TRIGGER: Primary | ICD-10-CM

## 2021-09-22 DIAGNOSIS — G43.009 MIGRAINE WITHOUT AURA AND WITHOUT STATUS MIGRAINOSUS, NOT INTRACTABLE: ICD-10-CM

## 2021-09-22 PROCEDURE — 99214 OFFICE O/P EST MOD 30 MIN: CPT | Performed by: PHYSICIAN ASSISTANT

## 2021-09-22 RX ORDER — CETIRIZINE HYDROCHLORIDE 10 MG/1
10 TABLET ORAL DAILY
Qty: 90 TABLET | Refills: 3 | Status: SHIPPED | OUTPATIENT
Start: 2021-09-22 | End: 2023-03-06

## 2021-09-22 RX ORDER — TOPIRAMATE 25 MG/1
25 TABLET, FILM COATED ORAL 2 TIMES DAILY
Qty: 30 TABLET | Refills: 3 | Status: SHIPPED | OUTPATIENT
Start: 2021-09-22 | End: 2021-09-23

## 2021-09-22 RX ORDER — FLUTICASONE PROPIONATE 50 MCG
2 SPRAY, SUSPENSION (ML) NASAL DAILY
Qty: 16 G | Refills: 3 | Status: SHIPPED | OUTPATIENT
Start: 2021-09-22 | End: 2023-03-06

## 2021-09-22 ASSESSMENT — MIFFLIN-ST. JEOR: SCORE: 1321.75

## 2021-09-22 NOTE — TELEPHONE ENCOUNTER
Patient was seen this morning by Rosaline Carr.  Patient was under the impression at the time of the office visit she would take Topiramate 1 time at bedtime, however sig reads 2 times daily, but quantity is for 30.  Please clarify how patient is to be taking medication.   Libia Jean-Baptiste RN

## 2021-09-22 NOTE — PROGRESS NOTES
Assessment & Plan   Problem List Items Addressed This Visit        Nervous and Auditory    Migraine    Relevant Medications    topiramate (TOPAMAX) 25 MG tablet      Other Visit Diagnoses     Non-seasonal allergic rhinitis, unspecified trigger    -  Primary    Relevant Medications    cetirizine (ZYRTEC) 10 MG tablet    fluticasone (FLONASE) 50 MCG/ACT nasal spray         Topamax 25 mg at bedtime for preventative migraine treatment     Zyrtec 10 mg daily   Flonase 2 sprays each nostril daily   mucinex Dm max strength 1 tablet twice a day for 2 weeks to clear up postnasal drainage       20 minutes spent on the date of the encounter doing chart review, history and exam, documentation and further activities per the note           Return in about 1 month (around 10/22/2021), or if symptoms worsen or fail to improve.    Rosaline Carr PA-C  [facility] Clinic[facility]NTHIA Oreilly is a 50 year old who presents for the following health issues     HPI     Acute Illness  Acute illness concerns: cough  Onset/Duration: 2-3 weeks more severe. In the last year or two patient reports a random mild cough form a tickle in the throat.   Symptoms:  Fever: no  Chills/Sweats: no  Headache (location?): YES  Sinus Pressure: no  Conjunctivitis:  no  Ear Pain: no  Rhinorrhea: YES- chronic and a lot of sneezing for the last 1-2 years   Congestion: no  Sore Throat: no  Cough: YES-non-productive  Wheeze: no  Decreased Appetite: no  Nausea: no  Vomiting: no  Diarrhea: no  Dysuria/Freq.: no  Dysuria or Hematuria: no  Fatigue/Achiness: no  Sick/Strep Exposure: no  Therapies tried and outcome: zpack-helped, but nasal drainage, PND and sneezing aren't better       Migraine     Since your last clinic visit, how have your headaches changed?  No change    How often are you getting headaches or migraines? Twice a month     Are you able to do normal daily activities when you have a migraine? Yes    Are you taking  "rescue/relief medications? (Select all that apply) Excedrin    How helpful is your rescue/relief medication?  I get some relief    Are you taking any medications to prevent migraines? (Select all that apply)  No    In the past 4 weeks, how often have you gone to urgent care or the emergency room because of your headaches?  0      Review of Systems   Constitutional, HEENT, cardiovascular, pulmonary, gi and gu systems are negative, except as otherwise noted.      Objective    /65 (BP Location: Left arm, Patient Position: Sitting, Cuff Size: Adult Small)   Pulse 62   Temp 98.2  F (36.8  C)   Resp 12   Ht 1.67 m (5' 5.75\")   Wt 69.4 kg (153 lb)   SpO2 96%   BMI 24.88 kg/m    Body mass index is 24.88 kg/m .  Physical Exam   GENERAL: healthy, alert and no distress  EYES: Eyes grossly normal to inspection, PERRL and conjunctivae and sclerae normal  HENT: normal cephalic/atraumatic, ear canals and TM's normal, nose and mouth without ulcers or lesions, rhinorrhea clear, oropharynx clear, oral mucous membranes moist and pale, postnasal drainage is present   NECK: no adenopathy, no asymmetry, masses, or scars and thyroid normal to palpation  RESP: lungs clear to auscultation - no rales, rhonchi or wheezes  CV: regular rate and rhythm, normal S1 S2, no S3 or S4, no murmur, click or rub, no peripheral edema and peripheral pulses strong  ABDOMEN: soft, nontender, no hepatosplenomegaly, no masses and bowel sounds normal  MS: no gross musculoskeletal defects noted, no edema                "

## 2021-09-23 RX ORDER — TOPIRAMATE 25 MG/1
25 TABLET, FILM COATED ORAL DAILY
Qty: 30 TABLET | Refills: 3 | Status: SHIPPED | OUTPATIENT
Start: 2021-09-23 | End: 2022-05-12

## 2021-09-23 NOTE — TELEPHONE ENCOUNTER
Called and left a detailed message on patient identified VM line.  Read provider message and instructions, verbatim.  Informed patient a new prescription reflecting the once daily dose was sent to her [facility] pharmacy in [place].  Patient to call back with any additional questions or concerns.      Nikky Wilder RN  [facility]

## 2021-10-31 ENCOUNTER — HEALTH MAINTENANCE LETTER (OUTPATIENT)
Age: 51
End: 2021-10-31

## 2021-12-09 ENCOUNTER — NURSE TRIAGE (OUTPATIENT)
Dept: FAMILY MEDICINE | Facility: CLINIC | Age: 51
End: 2021-12-09
Payer: MEDICARE

## 2021-12-09 NOTE — TELEPHONE ENCOUNTER
She is having some chest discomfort for the past couple days, worse yesterday. On her left side under her left breast. It feels like a tightening. She feels pain when she touches it. Hurts when she coughs or takes a big breath in. She has had a deep cough for 2 years. Worse in the past couple months. She has been to her doctor who gave her allergy pills and nasal spray. She started taking mucinex and robitussin. Minimal relief.     No fever. No known injury.     It comes and goes. She was at the gym for 2 hours and it hurt the whole time and was painful for about an hour after.     Per protocol she needs to be seen in the clinic today. Can't find an opening. Will route to care team for help with scheduling. Sheba Snider RN on 12/9/2021 at 11:34 AM      Reason for Disposition    All other patients with chest pain (Exception: fleeting chest pain lasting a few seconds)    Additional Information    Negative: Severe difficulty breathing (e.g., struggling for each breath, speaks in single words)    Negative: Passed out (i.e., fainted, collapsed and was not responding)    Negative: Difficult to awaken or acting confused (e.g., disoriented, slurred speech)    Negative: Shock suspected (e.g., cold/pale/clammy skin, too weak to stand, low BP, rapid pulse)    Negative: Chest pain lasting longer than 5 minutes and ANY of the following:* Over 45 years old* Over 30 years old and at least one cardiac risk factor (e.g., diabetes, high blood pressure, high cholesterol, smoker, or strong family history of heart disease)* History of heart disease (i.e., angina, heart attack, heart failure, bypass surgery, takes nitroglycerin)* Pain is crushing, pressure-like, or heavy    Negative: Heart beating < 50 beats per minute OR > 140 beats per minute    Negative: Visible sweat on face or sweat dripping down face    Negative: Sounds like a life-threatening emergency to the triager    Negative: Followed an injury to chest    Negative: SEVERE  "chest pain    Negative: Pain also in shoulder(s) or arm(s) or jaw    Negative: Difficulty breathing    Negative: Cocaine use within last 3 days    Negative: Major surgery in the past month    Negative: Hip or leg fracture (broken bone) in past month (or had cast on leg or ankle in past month)    Negative: Illness requiring prolonged bedrest in past month (e.g., immobilization, long hospital stay)    Negative: Long-distance travel in past month (e.g., car, bus, train, plane; with trip lasting 6 or more hours)    Negative: History of prior 'blood clot' in leg or lungs (i.e., deep vein thrombosis, pulmonary embolism)    Negative: History of inherited increased risk of blood clots (e.g., Factor 5 Leiden, Anti-thrombin 3, Protein C or Protein S deficiency, Prothrombin mutation)    Negative: Heart beating irregularly or very rapidly    Negative: Chest pain lasting longer than 5 minutes and occurred in last 3 days (72 hours) (Exception: feels exactly the same as previously diagnosed heartburn and has accompanying sour taste in mouth)    Negative: Chest pain or 'angina' comes and goes and is happening more often (increasing in frequency) or getting worse (increasing in severity) (Exception: chest pains that last only a few seconds)    Negative: Dizziness or lightheadedness    Negative: Coughing up blood    Negative: Patient sounds very sick or weak to the triager    Negative: Cancer treatment in the past two months (or has cancer now)    Negative: Patient says chest pain feels exactly the same as previously diagnosed 'heartburn'  and  describes burning in chest and accompanying sour taste in mouth    Negative: Rash in same area as pain (may be described as 'small blisters')    Negative: Chest pain(s) lasting a few seconds persists > 3 days    Negative: Fever > 100.4 F (38.0 C)    Answer Assessment - Initial Assessment Questions  1. LOCATION: \"Where does it hurt?\"        Below left breast  2. RADIATION: \"Does the pain go " "anywhere else?\" (e.g., into neck, jaw, arms, back)      none  3. ONSET: \"When did the chest pain begin?\" (Minutes, hours or days)       2 days ago  4. PATTERN \"Does the pain come and go, or has it been constant since it started?\"  \"Does it get worse with exertion?\"        Comes and goes  5. DURATION: \"How long does it last\" (e.g., seconds, minutes, hours)      hours  6. SEVERITY: \"How bad is the pain?\"  (e.g., Scale 1-10; mild, moderate, or severe)     - MILD (1-3): doesn't interfere with normal activities      - MODERATE (4-7): interferes with normal activities or awakens from sleep     - SEVERE (8-10): excruciating pain, unable to do any normal activities        3-4  7. CARDIAC RISK FACTORS: \"Do you have any history of heart problems or risk factors for heart disease?\" (e.g., angina, prior heart attack; diabetes, high blood pressure, high cholesterol, smoker, or strong family history of heart disease)      none  8. PULMONARY RISK FACTORS: \"Do you have any history of lung disease?\"  (e.g., blood clots in lung, asthma, emphysema, birth control pills)      none  9. CAUSE: \"What do you think is causing the chest pain?\"      unsure  10. OTHER SYMPTOMS: \"Do you have any other symptoms?\" (e.g., dizziness, nausea, vomiting, sweating, fever, difficulty breathing, cough)        none  11. PREGNANCY: \"Is there any chance you are pregnant?\" \"When was your last menstrual period?\"        none    Protocols used: CHEST PAIN-A-[place]      "

## 2021-12-09 NOTE — TELEPHONE ENCOUNTER
Writer attempted to contact patient regarding nurse triage message below. Patient was unable to answer and left a message to call the [facility] back at [phone] as ask to speak with a nurse.    If patient calls back please follow up regarding symptoms of chest pain.       Mamta Kraus RN, BSN  [facility]

## 2021-12-10 NOTE — TELEPHONE ENCOUNTER
Second attempt. Writer attempted to contact patient regarding nurse triage below. Patient was unavailable and a voicemail message was left to call the [facility] back at [phone] and ask to speak with a nurse.     If patient calls back please follow up regarding symptoms of chest pain and noted in the nurse triage note below.    Mamta Kraus RN, BSN  [facility]

## 2021-12-13 NOTE — TELEPHONE ENCOUNTER
Third attempt to contact patient regarding below message. Left message on voicemail that this is the last attempt we will make and to call back [phone].    Lorna Rhodes RN [facility]

## 2021-12-14 NOTE — TELEPHONE ENCOUNTER
Per patient has had a bad cough for past couple months  Chest pain comes and goes, feels like a tightening  No shortness of breath  Goes to the gym daily, does not experience chest pain with exertion  No arm pain, no nausea, no jaw pain    Instructed patient needs to be seen today as she has not been see for this yet  Patient to go to urgent care now for further evaluation  Patient verbalized understanding    Nell Herrera BSN, RN

## 2021-12-15 ENCOUNTER — ANCILLARY PROCEDURE (OUTPATIENT)
Dept: GENERAL RADIOLOGY | Facility: CLINIC | Age: 51
End: 2021-12-15
Attending: PHYSICIAN ASSISTANT
Payer: MEDICARE

## 2021-12-15 ENCOUNTER — OFFICE VISIT (OUTPATIENT)
Dept: FAMILY MEDICINE | Facility: CLINIC | Age: 51
End: 2021-12-15
Payer: MEDICARE

## 2021-12-15 VITALS
SYSTOLIC BLOOD PRESSURE: 107 MMHG | BODY MASS INDEX: 25.23 KG/M2 | WEIGHT: 157 LBS | HEIGHT: 66 IN | HEART RATE: 82 BPM | TEMPERATURE: 97.8 F | DIASTOLIC BLOOD PRESSURE: 72 MMHG | OXYGEN SATURATION: 98 %

## 2021-12-15 DIAGNOSIS — R05.3 CHRONIC COUGH: Primary | ICD-10-CM

## 2021-12-15 DIAGNOSIS — R33.9 URINARY RETENTION: ICD-10-CM

## 2021-12-15 DIAGNOSIS — R05.3 CHRONIC COUGH: ICD-10-CM

## 2021-12-15 LAB
ALBUMIN UR-MCNC: NEGATIVE MG/DL
APPEARANCE UR: CLEAR
BACTERIA #/AREA URNS HPF: ABNORMAL /HPF
BILIRUB UR QL STRIP: NEGATIVE
COLOR UR AUTO: YELLOW
GLUCOSE UR STRIP-MCNC: NEGATIVE MG/DL
HGB UR QL STRIP: ABNORMAL
HYALINE CASTS #/AREA URNS LPF: ABNORMAL /LPF
KETONES UR STRIP-MCNC: NEGATIVE MG/DL
LEUKOCYTE ESTERASE UR QL STRIP: NEGATIVE
MUCOUS THREADS #/AREA URNS LPF: PRESENT /LPF
NITRATE UR QL: NEGATIVE
PH UR STRIP: 5.5 [PH] (ref 5–7)
RBC #/AREA URNS AUTO: ABNORMAL /HPF
SP GR UR STRIP: 1.02 (ref 1–1.03)
SQUAMOUS #/AREA URNS AUTO: ABNORMAL /LPF
UROBILINOGEN UR STRIP-ACNC: 0.2 E.U./DL
WBC #/AREA URNS AUTO: ABNORMAL /HPF

## 2021-12-15 PROCEDURE — 87086 URINE CULTURE/COLONY COUNT: CPT | Performed by: PHYSICIAN ASSISTANT

## 2021-12-15 PROCEDURE — 71046 X-RAY EXAM CHEST 2 VIEWS: CPT | Performed by: RADIOLOGY

## 2021-12-15 PROCEDURE — 81001 URINALYSIS AUTO W/SCOPE: CPT | Performed by: PHYSICIAN ASSISTANT

## 2021-12-15 PROCEDURE — 99214 OFFICE O/P EST MOD 30 MIN: CPT | Performed by: PHYSICIAN ASSISTANT

## 2021-12-15 ASSESSMENT — PAIN SCALES - GENERAL: PAINLEVEL: NO PAIN (0)

## 2021-12-15 ASSESSMENT — MIFFLIN-ST. JEOR: SCORE: 1334.93

## 2021-12-15 NOTE — PATIENT INSTRUCTIONS
At [facility], we strive to deliver an exceptional experience to you, every time we see you. If you receive a survey, please complete it as we do value your feedback.  If you have MyChart, you can expect to receive results automatically within 24 hours of their completion.  Your provider will send a note interpreting your results as well.   If you do not have MyChart, you should receive your results in about a week by mail.    Your care team:                            Family Medicine Internal Medicine   MD Jaswant Arias MD Shantel Branch-Fleming, MD Srinivasa Vaka, MD Katya Belousova, PADEBRA Owen, APRN CNP    Jeffy Franklin, MD Pediatrics   Young Drake, PADEBRA Talamantes, CNP MD Breanna Ray APRN CNP   MD Marah Paredes MD Deborah Mielke, MD Cece Rincon, APRN [facility]      Clinic hours: Monday - Thursday 7 am-6 pm; Fridays 7 am-5 pm.   Urgent care: Monday - Friday 10 am- 8 pm; Saturday and Sunday 9 am-5 pm.    Clinic: [phone]       [facility]: Monday - Thursday 8 am - 7 pm; Friday 8 am - 6 pm  [facility]: [phone]     Use [email or web address] for 24/7 diagnosis and treatment of dozens of conditions.

## 2021-12-15 NOTE — PROGRESS NOTES
Assessment & Plan  presented with 2 year history of mild cough with further symptoms as below. Exam was benign, and she denied associated illnesses or any acid reflux over the past 2 years. Has tried allergy medications but none have relieved symptoms. X-ray and H-pylori stool sample ordered to check for for possible etiologies of cough and she was told that next steps would include pulm referral. Patient also endorsed urinary retention with the feeling of not being able to completely void.  UA ordered as below to check for infection with urology referral being next step. Also noted hot flashes that have been occurring lately so lifestyle changes such as diet change and dressing in layers were discussed.    Problem List Items Addressed This Visit     None      Visit Diagnoses     Chronic cough    -  Primary    Relevant Orders    XR Chest 2 Views (Completed)    Helicobacter pylori Antigen Stool    Urinary retention        Relevant Orders    UA reflex to Microscopic and Culture (Completed)    Urine Microscopic (Completed)    Urine Culture Aerobic Bacterial - lab collect               21 minutes spent on the date of the encounter doing chart review, history and exam, documentation and further activities per the note  {     Return in about 4 weeks (around 1/12/2022) for Specialist Follow-up.    YANDEL Bustos  [facility]    Subjective     HPI     She presents with coughing for the past couple of years which has caused slight rib pain on left side that may have been caused by cough. Endorses coughing up blood occasionally but only small amounts that she can taste nothing visual. no illnesses over the past couple of years related to cough, stuffed up past couple of days, negative for heartburn/acid reflux, has tried mucinex, robitussen, and other medications without it helping. She also feels like she has to go to the bathroom and then hardly anything comes out which started a  "couple of weeks ago.        Review of Systems   PULM- positive for cough, negative for chest pain or wheezing  UI- urinary retention, negative for pain or incontinence       Objective    /72 (BP Location: Left arm, Patient Position: Chair, Cuff Size: Adult Regular)   Pulse 82   Temp 97.8  F (36.6  C) (Tympanic)   Ht 1.67 m (5' 5.75\")   Wt 71.2 kg (157 lb)   SpO2 98%   Breastfeeding No   BMI 25.53 kg/m      Physical Exam   GENERAL: healthy, alert and no distress  HENT:nose and mouth without ulcers or lesions  NECK: no adenopathy, no asymmetry, masses, or scars and thyroid normal to palpation  RESP: lungs clear to auscultation - no rales, rhonchi or wheezes  CV: regular rate and rhythm, normal S1 S2, no S3 or S4, no murmur, click or rub, no peripheral edema and peripheral pulses strong  NEURO: Normal strength and tone, mentation intact and speech normal  PSYCH: mentation appears normal, affect normal/bright          "

## 2021-12-16 LAB — BACTERIA UR CULT: NORMAL

## 2021-12-17 ENCOUNTER — TELEPHONE (OUTPATIENT)
Dept: FAMILY MEDICINE | Facility: CLINIC | Age: 51
End: 2021-12-17
Payer: MEDICARE

## 2021-12-17 ENCOUNTER — TELEPHONE (OUTPATIENT)
Dept: UROLOGY | Facility: CLINIC | Age: 51
End: 2021-12-17
Payer: MEDICARE

## 2021-12-17 ENCOUNTER — MYC MEDICAL ADVICE (OUTPATIENT)
Dept: FAMILY MEDICINE | Facility: CLINIC | Age: 51
End: 2021-12-17
Payer: MEDICARE

## 2021-12-17 DIAGNOSIS — R33.9 URINARY RETENTION: Primary | ICD-10-CM

## 2021-12-17 PROCEDURE — 87338 HPYLORI STOOL AG IA: CPT | Performed by: PHYSICIAN ASSISTANT

## 2021-12-17 NOTE — TELEPHONE ENCOUNTER
[facility] Call Center    Phone Message    May a detailed message be left on voicemail: yes     Reason for Call: Appointment Intake    Referring Provider Name: Jasmeet Drake  Diagnosis and/or Symptoms: Urinary Retention    Patient being referred for urinary retention by referring provider. Sending encounter message per guideline instructions for clinic review and follow-up with patient.     Action Taken: Message routed to:  [facility] ([facility]):  Urology    Travel Screening: Not Applicable

## 2021-12-17 NOTE — TELEPHONE ENCOUNTER
Chart reviewed. [facility] is booking out to January as providers are out of the office. Message sent to [place] scheduling team with request to assist in scheduling with any available urology provider.    Nell Flores RN, BSN

## 2021-12-18 ENCOUNTER — PRE VISIT (OUTPATIENT)
Dept: UROLOGY | Facility: CLINIC | Age: 51
End: 2021-12-18
Payer: MEDICARE

## 2021-12-18 NOTE — TELEPHONE ENCOUNTER
MEDICAL RECORDS REQUEST   [facility]  Urology Clinic  [address] [place] [address]  PHONE: [phone]  Fax: [phone]        FUTURE VISIT INFORMATION                                                   Denilson Sweet, : 1970 scheduled for future visit at [facility]    APPOINTMENT INFORMATION:    Date: 2021    Provider: Karley Castle CNP    Reason for Visit/Diagnosis: Patient being referred for urinary retention    REFERRAL INFORMATION:    Referring provider:  Jasmeet Drake PA    Specialty: N/A    Referring providers clinic:   FP/IM/PEDS    Clinic contact number: N/A    RECORDS REQUESTED FOR VISIT                                                     NOTES  STATUS/DETAILS   OFFICE NOTE from referring provider  yes, 12/15/2021 -- Jasmeet Drake PA in  [facility]   OFFICE NOTE from other specialist  no   DISCHARGE SUMMARY from hospital  no   DISCHARGE REPORT from the ER  no   OPERATIVE REPORT  no   MEDICATION LIST  yes   LABS     URINALYSIS (UA)  yes   URINE CYTOLOGY  no     PRE-VISIT CHECKLIST      Record collection complete Yes   Appointment appropriately scheduled           (right time/right provider) Yes   Joint diagnostic appointment coordinated correctly          (ensure right order & amount of time) Yes   MyChart activation Yes   Questionnaire complete If no, please explain pending

## 2021-12-20 ENCOUNTER — PRE VISIT (OUTPATIENT)
Dept: UROLOGY | Facility: CLINIC | Age: 51
End: 2021-12-20
Payer: MEDICARE

## 2021-12-20 LAB — H PYLORI AG STL QL IA: NEGATIVE

## 2021-12-20 NOTE — TELEPHONE ENCOUNTER
This writer attempted to contact Denilson on 12/20/21      Reason for call Providers message below and Left VM to give the nurses a return call at the [facility] at [phone]      If patient calls back:   Relay message from provider below, (read verbatim), document that pt called and close encounter        Hilda Bullard RN BSN

## 2021-12-20 NOTE — TELEPHONE ENCOUNTER
"Please contact patient as per unread [place] message:    Hello.  Your urine sample showed only a small amount of blood.  I'm am no sure of the relevance of this.  I have placed a referral to urology.  If you don't hear from them by mid next week, please call [phone] to schedule an appt with them     Please let me know if you have any questions.     Young Drake PA-C\"     "

## 2021-12-20 NOTE — TELEPHONE ENCOUNTER
Reason for visit: consult      Dx/Hx/Sx: urinary retention     Records/imaging/labs/orders: in epic     At Rooming: video visit

## 2021-12-21 NOTE — TELEPHONE ENCOUNTER
"Patient is set up for urology and viewed her \"lab results\" in her chart. Left message for her to review Only-apartments message and call [phone] with any further questions.    Lorna Rhodes RN [facility]    "

## 2021-12-28 ENCOUNTER — TELEPHONE (OUTPATIENT)
Dept: FAMILY MEDICINE | Facility: CLINIC | Age: 51
End: 2021-12-28

## 2021-12-28 DIAGNOSIS — R05.3 CHRONIC COUGH: Primary | ICD-10-CM

## 2021-12-28 NOTE — TELEPHONE ENCOUNTER
Pt called because she has a cough and left sided cramp/pain.     She has been seen on 12/15/21 for the cough. she had had multiple test done such as a stool test and x-rays but nothing is found.    Is wondering if she should get a referral to a specialist because she wants to figure ou what's going on.    She had an appointment with urology today but cancelled it because she wasn't sure why she needed to see them.    Advised pt to reschedule appointment with urology as referred by provider.     Routing to provider to review and advise.    Marianne Guerra RN  [facility]

## 2021-12-31 NOTE — TELEPHONE ENCOUNTER
Pt notified via phone of provider message below as written. Pt indicates understanding of issues and agrees with the plan.    COURTNEY LariosN, RN

## 2021-12-31 NOTE — TELEPHONE ENCOUNTER
Pulmonology Referral placed. She can call [phone] to schedule    The urologist was to follow up on her urinary symptoms and small amount of blood found in the urine.      Young Drake PA-C

## 2022-02-20 ENCOUNTER — HEALTH MAINTENANCE LETTER (OUTPATIENT)
Age: 52
End: 2022-02-20

## 2022-05-09 DIAGNOSIS — G43.009 MIGRAINE WITHOUT AURA AND WITHOUT STATUS MIGRAINOSUS, NOT INTRACTABLE: ICD-10-CM

## 2022-05-11 NOTE — TELEPHONE ENCOUNTER
"Requested Prescriptions   Pending Prescriptions Disp Refills    topiramate (TOPAMAX) 25 MG tablet [Pharmacy Med Name: TOPIRAMATE 25 MG TABLET] 90 tablet 1     Sig: TAKE 1 TABLET BY MOUTH EVERY DAY        Anti-Seizure Meds Protocol  Failed - 5/9/2022  6:43 PM        Failed - Review Authorizing provider's last note.      Refer to last progress notes: confirm request is for original authorizing provider (cannot be through other providers).          Failed - Normal CBC on file in past 26 months     Recent Labs   Lab Test 11/06/19  1154   WBC 8.1   RBC 4.21   HGB 14.1   HCT 43.7                      Failed - Normal ALT or AST on file in past 26 months     No lab results found.  No lab results found.          Failed - Normal platelet count on file in past 26 months       Recent Labs   Lab Test 11/06/19  1154                  Passed - Recent (12 mo) or future (30 days) visit within the authorizing provider's specialty     Patient has had an office visit with the authorizing provider or a provider within the authorizing providers department within the previous 12 mos or has a future within next 30 days. See \"Patient Info\" tab in inbasket, or \"Choose Columns\" in Meds & Orders section of the refill encounter.              Passed - Medication is active on med list        Passed - No active pregnancy on record        Passed - No positive pregnancy test in last 12 months              "

## 2022-05-12 RX ORDER — TOPIRAMATE 25 MG/1
TABLET, FILM COATED ORAL
Qty: 90 TABLET | Refills: 1 | Status: SHIPPED | OUTPATIENT
Start: 2022-05-12 | End: 2023-03-06

## 2022-07-06 ENCOUNTER — PATIENT OUTREACH (OUTPATIENT)
Dept: FAMILY MEDICINE | Facility: CLINIC | Age: 52
End: 2022-07-06

## 2022-07-06 NOTE — TELEPHONE ENCOUNTER
Patient Quality Outreach    Patient is due for the following:   Colon Cancer Screening -  Colonoscopy    NEXT STEPS:   No follow up needed at this time.    Type of outreach:    Sent ClaimReturn message.      Questions for provider review:    None     Karley Cohen

## 2022-10-23 ENCOUNTER — HEALTH MAINTENANCE LETTER (OUTPATIENT)
Age: 52
End: 2022-10-23

## 2022-12-10 ENCOUNTER — HEALTH MAINTENANCE LETTER (OUTPATIENT)
Age: 52
End: 2022-12-10

## 2023-02-05 ENCOUNTER — NURSE TRIAGE (OUTPATIENT)
Dept: NURSING | Facility: CLINIC | Age: 53
End: 2023-02-05
Payer: MEDICARE

## 2023-02-05 NOTE — TELEPHONE ENCOUNTER
"Woke up this am when she felt sweaty and dizzy and she felt like she was going to pass out.    She tried to sit down when she fainted and her breathing was very labored     states that it looked like she was having a seizure and that her eye rolled back into her head and that she was unresponsive.    After the episode she felt very nauseated and had a headache.    She is feeling better now but is not hungry.    Chelsey Vazquez RN  [place] Nurse Advisor  1:09 PM  2/5/2023         Reason for Disposition    Seizure suspected (e.g., muscle jerking or shaking followed by confusion)    Sounds like a life-threatening emergency to the triager    Additional Information    Negative: Still unconscious    Negative: Difficult to awaken or acting confused (e.g., disoriented, slurred speech)    Negative: Shock suspected (e.g., cold/pale/clammy skin, too weak to stand, low BP, rapid pulse)    Negative: Difficulty breathing    Negative: Bluish (or gray) lips or face now    Negative: Chest pain    Negative: Extra heart beats or heart is beating fast (i.e.,\"palpitations\")    Negative: Bleeding (e.g., vomiting blood, rectal bleeding or tarry stools, severe vaginal bleeding)(Exception: fainted from sight of small amount of blood; small cut or abrasion)    Negative: Fainted suddenly after medicine, allergic food or bee sting    Negative: Age > 50 years (Exception: occurred > 1 hour ago AND now feels completely fine)    Negative: History of heart problems (e.g., congestive heart failure, heart attack)    Negative: [1] Fainted > 15 minutes ago AND [2] still feels too weak or dizzy to stand    Negative: Sounds like a life-threatening emergency to the triager    Protocols used: LNIVXADV-F-YX, IUYBLKA-T-TL      "

## 2023-03-05 ASSESSMENT — ENCOUNTER SYMPTOMS
ARTHRALGIAS: 0
HEMATURIA: 0
HEARTBURN: 0
ABDOMINAL PAIN: 0
NAUSEA: 0
COUGH: 0
CONSTIPATION: 0
FEVER: 0
BREAST MASS: 0
PARESTHESIAS: 0
NERVOUS/ANXIOUS: 0
WEAKNESS: 0
DIZZINESS: 0
SHORTNESS OF BREATH: 0
HEADACHES: 1
SORE THROAT: 0
FREQUENCY: 0
EYE PAIN: 0
JOINT SWELLING: 0
HEMATOCHEZIA: 0
MYALGIAS: 0
DYSURIA: 0
PALPITATIONS: 0
CHILLS: 0
DIARRHEA: 0

## 2023-03-05 ASSESSMENT — ACTIVITIES OF DAILY LIVING (ADL): CURRENT_FUNCTION: NO ASSISTANCE NEEDED

## 2023-03-06 ENCOUNTER — OFFICE VISIT (OUTPATIENT)
Dept: FAMILY MEDICINE | Facility: CLINIC | Age: 53
End: 2023-03-06
Payer: MEDICARE

## 2023-03-06 ENCOUNTER — LAB (OUTPATIENT)
Dept: FAMILY MEDICINE | Facility: CLINIC | Age: 53
End: 2023-03-06

## 2023-03-06 VITALS
RESPIRATION RATE: 16 BRPM | SYSTOLIC BLOOD PRESSURE: 102 MMHG | WEIGHT: 152 LBS | TEMPERATURE: 98 F | HEIGHT: 65 IN | OXYGEN SATURATION: 99 % | BODY MASS INDEX: 25.33 KG/M2 | HEART RATE: 68 BPM | DIASTOLIC BLOOD PRESSURE: 60 MMHG

## 2023-03-06 DIAGNOSIS — Z12.11 SCREEN FOR COLON CANCER: ICD-10-CM

## 2023-03-06 DIAGNOSIS — D18.01 CHERRY ANGIOMA: ICD-10-CM

## 2023-03-06 DIAGNOSIS — Z12.31 ENCOUNTER FOR SCREENING MAMMOGRAM FOR BREAST CANCER: ICD-10-CM

## 2023-03-06 DIAGNOSIS — Z00.00 ROUTINE GENERAL MEDICAL EXAMINATION AT A HEALTH CARE FACILITY: Primary | ICD-10-CM

## 2023-03-06 DIAGNOSIS — Z12.4 CERVICAL CANCER SCREENING: ICD-10-CM

## 2023-03-06 DIAGNOSIS — R55 VASOVAGAL SYNCOPE: ICD-10-CM

## 2023-03-06 DIAGNOSIS — Z12.31 VISIT FOR SCREENING MAMMOGRAM: ICD-10-CM

## 2023-03-06 LAB
ANION GAP SERPL CALCULATED.3IONS-SCNC: 1 MMOL/L (ref 3–14)
BUN SERPL-MCNC: 15 MG/DL (ref 7–30)
CALCIUM SERPL-MCNC: 9.9 MG/DL (ref 8.5–10.1)
CHLORIDE BLD-SCNC: 107 MMOL/L (ref 94–109)
CHOLEST SERPL-MCNC: 213 MG/DL
CO2 SERPL-SCNC: 29 MMOL/L (ref 20–32)
CREAT SERPL-MCNC: 0.97 MG/DL (ref 0.52–1.04)
FASTING STATUS PATIENT QL REPORTED: ABNORMAL
GFR SERPL CREATININE-BSD FRML MDRD: 70 ML/MIN/1.73M2
GLUCOSE BLD-MCNC: 91 MG/DL (ref 70–99)
HDLC SERPL-MCNC: 80 MG/DL
LDLC SERPL CALC-MCNC: 118 MG/DL
NONHDLC SERPL-MCNC: 133 MG/DL
POTASSIUM BLD-SCNC: 4.1 MMOL/L (ref 3.4–5.3)
SODIUM SERPL-SCNC: 137 MMOL/L (ref 133–144)
TRIGL SERPL-MCNC: 75 MG/DL

## 2023-03-06 PROCEDURE — 80061 LIPID PANEL: CPT | Performed by: PHYSICIAN ASSISTANT

## 2023-03-06 PROCEDURE — G0145 SCR C/V CYTO,THINLAYER,RESCR: HCPCS | Performed by: PHYSICIAN ASSISTANT

## 2023-03-06 PROCEDURE — 36415 COLL VENOUS BLD VENIPUNCTURE: CPT | Performed by: PHYSICIAN ASSISTANT

## 2023-03-06 PROCEDURE — 99212 OFFICE O/P EST SF 10 MIN: CPT | Mod: 25 | Performed by: PHYSICIAN ASSISTANT

## 2023-03-06 PROCEDURE — 87624 HPV HI-RISK TYP POOLED RSLT: CPT | Performed by: PHYSICIAN ASSISTANT

## 2023-03-06 PROCEDURE — 99207 PR ANNUAL WELLNESS VISIT, PPS, SUBSEQUENT STAT: CPT | Mod: 25 | Performed by: PHYSICIAN ASSISTANT

## 2023-03-06 PROCEDURE — 80048 BASIC METABOLIC PNL TOTAL CA: CPT | Performed by: PHYSICIAN ASSISTANT

## 2023-03-06 ASSESSMENT — ACTIVITIES OF DAILY LIVING (ADL): CURRENT_FUNCTION: NO ASSISTANCE NEEDED

## 2023-03-06 ASSESSMENT — ENCOUNTER SYMPTOMS
PALPITATIONS: 0
HEADACHES: 1
HEMATURIA: 0
CONSTIPATION: 0
COUGH: 0
EYE PAIN: 0
PARESTHESIAS: 0
NERVOUS/ANXIOUS: 0
FREQUENCY: 0
SORE THROAT: 0
DIZZINESS: 0
ARTHRALGIAS: 0
JOINT SWELLING: 0
HEARTBURN: 0
HEMATOCHEZIA: 0
ABDOMINAL PAIN: 0
DYSURIA: 0
NAUSEA: 0
WEAKNESS: 0
FEVER: 0
CHILLS: 0
BREAST MASS: 0
DIARRHEA: 0
MYALGIAS: 0
SHORTNESS OF BREATH: 0

## 2023-03-06 ASSESSMENT — PAIN SCALES - GENERAL: PAINLEVEL: NO PAIN (0)

## 2023-03-06 NOTE — PROGRESS NOTES
"   SUBJECTIVE:   CC: Denilson is an 52 year old who presents for preventive health visit.   Patient has been advised of split billing requirements and indicates understanding: Yes  Healthy Habits:     In general, how would you rate your overall health?  Good    Frequency of exercise:  4-5 days/week    Duration of exercise:  30-45 minutes    Do you usually eat at least 4 servings of fruit and vegetables a day, include whole grains    & fiber and avoid regularly eating high fat or \"junk\" foods?  No    Taking medications regularly:  Yes    Medication side effects:  None    Ability to successfully perform activities of daily living:  No assistance needed    Home Safety:  No safety concerns identified    Hearing Impairment:  No hearing concerns    In the past 6 months, have you been bothered by leaking of urine?  No    In general, how would you rate your overall mental or emotional health?  Good      PHQ-2 Total Score: 0    Additional concerns today:  No          passed out 1 month ago      Duration: once 1 month and once 2 years ago    Description (location/character/radiation): got lighheaded after getting up from bed and passed out    Intensity:  mild    Accompanying signs and symptoms: no chest pain no shortness of breath     History (similar episodes/previous evaluation):     Precipitating or alleviating factors: got up from bed    Therapies tried and outcome: None     Concern:   Patient has multiple red small moles that she wants to be checked .     Today's PHQ-2 Score:   PHQ-2 ( 1999 [employer]) 3/5/2023   Q1: Little interest or pleasure in doing things 0   Q2: Feeling down, depressed or hopeless 0   PHQ-2 Score 0   PHQ-2 Total Score (12-17 Years)- Positive if 3 or more points; Administer PHQ-A if positive -   Q1: Little interest or pleasure in doing things Not at all   Q2: Feeling down, depressed or hopeless Not at all   PHQ-2 Score 0           Social History     Tobacco Use     Smoking status: Never     Passive " exposure: Never     Smokeless tobacco: Never   Substance Use Topics     Alcohol use: No         Alcohol Use 3/5/2023   Prescreen: >3 drinks/day or >7 drinks/week? No       Reviewed orders with patient.  Reviewed health maintenance and updated orders accordingly - Yes  Patient Active Problem List   Diagnosis     Genital herpes     CARDIOVASCULAR SCREENING; LDL GOAL LESS THAN 160     Bipolar 1 disorder (H)     Migraine     Closed displaced fracture of head of right radius, initial encounter     Past Surgical History:   Procedure Laterality Date     BIOPSY BREAST      RT     LAPAROSCOPY PROCEDURE UNLISTED      ABLATION       Social History     Tobacco Use     Smoking status: Never     Passive exposure: Never     Smokeless tobacco: Never   Substance Use Topics     Alcohol use: No     Family History   Problem Relation Age of Onset     Osteoporosis Mother      Cancer - colorectal Paternal Grandmother          Current Outpatient Medications   Medication Sig Dispense Refill     lithium ER (LITHOBID/ESKALITH CR) 300 MG CR tablet Take 300 mg by mouth 2 times daily        LORazepam (ATIVAN) 0.5 MG tablet Take 1 tablet by mouth 2 times daily as needed.       zolpidem (AMBIEN) 5 MG tablet Take 10 mg by mouth nightly as needed for sleep        CALCIUM 600 + D OR None Entered (Patient not taking: Reported on 3/6/2023)       Multiple Vitamin (MULTI-VITAMIN PO) Take  by mouth. (Patient not taking: Reported on 3/6/2023)       Allergies   Allergen Reactions     Valproic Acid Rash     Lamotrigine      PN: Anxiety, mood swings, manic symptoms       Breast Cancer Screening:    FHS-7:   Breast CA Risk Assessment (FHS-7) 3/5/2023   Did any of your first-degree relatives have breast or ovarian cancer? No   Did any of your relatives have bilateral breast cancer? No   Did any man in your family have breast cancer? No   Did any woman in your family have breast and ovarian cancer? No   Did any woman in your family have breast cancer before age  50 y? No   Do you have 2 or more relatives with breast and/or ovarian cancer? No   Do you have 2 or more relatives with breast and/or bowel cancer? No     click delete button to remove this line now  Mammogram Screening: Recommended annual mammography  Pertinent mammograms are reviewed under the imaging tab.    History of abnormal Pap smear: NO - age 30-65 PAP every 5 years with negative HPV co-testing recommended  PAP / HPV Latest Ref Rng & Units 3/6/2023 1/25/2018 10/24/2014   PAP   Negative for Intraepithelial Lesion or Malignancy (NILM) - -   PAP (Historical) - - NIL NIL   HPV16 NEG:Negative - Negative -   HPV18 NEG:Negative - Negative -   HRHPV NEG:Negative - Negative -     Reviewed and updated as needed this visit by clinical staff   Tobacco  Allergies  Meds  Problems  Med Hx  Surg Hx  Fam Hx          Reviewed and updated as needed this visit by Provider   Tobacco  Allergies  Meds  Problems  Med Hx  Surg Hx  Fam Hx             Review of Systems   Constitutional: Negative for chills and fever.   HENT: Negative for congestion, ear pain, hearing loss and sore throat.    Eyes: Negative for pain and visual disturbance.   Respiratory: Negative for cough and shortness of breath.    Cardiovascular: Negative for chest pain, palpitations and peripheral edema.   Gastrointestinal: Negative for abdominal pain, constipation, diarrhea, heartburn, hematochezia and nausea.   Breasts:  Negative for tenderness, breast mass and discharge.   Genitourinary: Negative for dysuria, frequency, genital sores, hematuria, pelvic pain, urgency, vaginal bleeding and vaginal discharge.   Musculoskeletal: Negative for arthralgias, joint swelling and myalgias.   Skin: Negative for rash.   Neurological: Positive for headaches. Negative for dizziness, weakness and paresthesias.   Psychiatric/Behavioral: Negative for mood changes. The patient is not nervous/anxious.      CONSTITUTIONAL: NEGATIVE for fever, chills, change in  "weight  INTEGUMENTARY/SKIN: NEGATIVE for worrisome rashes, moles or lesions  EYES: NEGATIVE for vision changes or irritation  ENT: NEGATIVE for ear, mouth and throat problems  RESP: NEGATIVE for significant cough or SOB  CV: NEGATIVE for chest pain, palpitations or peripheral edema  GI: NEGATIVE for nausea, abdominal pain, heartburn, or change in bowel habits  : NEGATIVE for unusual urinary or vaginal symptoms. No vaginal bleeding.  MUSCULOSKELETAL: NEGATIVE for significant arthralgias or myalgia  NEURO: NEGATIVE for weakness, dizziness or paresthesias  PSYCHIATRIC: NEGATIVE for changes in mood or affect      OBJECTIVE:   /60 (BP Location: Left arm, Patient Position: Sitting, Cuff Size: Adult Regular)   Pulse 68   Temp 98  F (36.7  C) (Oral)   Resp 16   Ht 1.65 m (5' 4.96\")   Wt 68.9 kg (152 lb)   LMP 07/06/2017   SpO2 99%   BMI 25.32 kg/m    Physical Exam  GENERAL APPEARANCE: healthy, alert and no distress  EYES: Eyes grossly normal to inspection, PERRL and conjunctivae and sclerae normal  HENT: ear canals and TM's normal, nose and mouth without ulcers or lesions, oropharynx clear and oral mucous membranes moist  NECK: no adenopathy, no asymmetry, masses, or scars and thyroid normal to palpation  RESP: lungs clear to auscultation - no rales, rhonchi or wheezes  BREAST: normal without masses, tenderness or nipple discharge and no palpable axillary masses or adenopathy  CV: regular rate and rhythm, normal S1 S2, no S3 or S4, no murmur, click or rub, no peripheral edema and peripheral pulses strong  ABDOMEN: soft, nontender, no hepatosplenomegaly, no masses and bowel sounds normal   (female): normal female external genitalia, normal urethral meatus, vaginal mucosal atrophy noted, normal cervix, adnexae, and uterus without masses or abnormal discharge  MS: no musculoskeletal defects are noted and gait is age appropriate without ataxia  SKIN: no suspicious lesions or rashes  NEURO: Normal strength and " tone, sensory exam grossly normal, mentation intact and speech normal  PSYCH: mentation appears normal and affect normal/bright    Diagnostic Test Results:  Labs reviewed in Epic    ASSESSMENT/PLAN:   Denilson was seen today for physical.    Diagnoses and all orders for this visit:    Routine general medical examination at a health care facility  -     Lipid Profile (Chol, Trig, HDL, LDL calc); Future  -     Basic metabolic panel  (Ca, Cl, CO2, Creat, Gluc, K, Na, BUN); Future  -     Basic metabolic panel  (Ca, Cl, CO2, Creat, Gluc, K, Na, BUN)  -     Lipid Profile (Chol, Trig, HDL, LDL calc)    Screen for colon cancer  -     Cancel: Colonoscopy Screening  Referral; Future  -     COLOGUARD(EXACT SCIENCES); Future    Visit for screening mammogram  -     MA SCREENING DIGITAL BILAT - Future  (s+30); Future    Encounter for screening mammogram for breast cancer  -     MA SCREENING DIGITAL BILAT - Future  (s+30); Future    Cervical cancer screening  -     Pap Screen with HPV - recommended age 30 - 65 years  -     HPV Hold (Lab Only)  -     HPV High Risk Types DNA Cervical    Cherry angioma    Vasovagal syncope    Other orders  -     REVIEW OF HEALTH MAINTENANCE PROTOCOL ORDERS      To prevent vasovagal reactions-hydrate well, get up slowly, work on anxiety control.    Cherry angiomas are benign skin formations, patient was reassured    Schedule mammogram, patient declined colonoscopy, prefers cologuard    Patient has been advised of split billing requirements and indicates understanding: Yes      COUNSELING:  Reviewed preventive health counseling, as reflected in patient instructions       Regular exercise       Healthy diet/nutrition        She reports that she has never smoked. She has never been exposed to tobacco smoke. She has never used smokeless tobacco.      KARYNA Sexton [facility]

## 2023-03-08 LAB
BKR LAB AP GYN ADEQUACY: NORMAL
BKR LAB AP GYN INTERPRETATION: NORMAL
BKR LAB AP HPV REFLEX: NORMAL
BKR LAB AP PREVIOUS ABNORMAL: NORMAL
PATH REPORT.COMMENTS IMP SPEC: NORMAL
PATH REPORT.COMMENTS IMP SPEC: NORMAL
PATH REPORT.RELEVANT HX SPEC: NORMAL

## 2023-03-10 ENCOUNTER — ANCILLARY PROCEDURE (OUTPATIENT)
Dept: MAMMOGRAPHY | Facility: CLINIC | Age: 53
End: 2023-03-10
Payer: MEDICARE

## 2023-03-10 DIAGNOSIS — Z12.31 ENCOUNTER FOR SCREENING MAMMOGRAM FOR BREAST CANCER: ICD-10-CM

## 2023-03-10 DIAGNOSIS — Z12.31 VISIT FOR SCREENING MAMMOGRAM: ICD-10-CM

## 2023-03-10 LAB
HUMAN PAPILLOMA VIRUS 16 DNA: NEGATIVE
HUMAN PAPILLOMA VIRUS 18 DNA: NEGATIVE
HUMAN PAPILLOMA VIRUS FINAL DIAGNOSIS: NORMAL
HUMAN PAPILLOMA VIRUS OTHER HR: NEGATIVE

## 2023-03-10 PROCEDURE — 77067 SCR MAMMO BI INCL CAD: CPT | Mod: TC | Performed by: RADIOLOGY

## 2023-06-08 ENCOUNTER — NURSE TRIAGE (OUTPATIENT)
Dept: NURSING | Facility: CLINIC | Age: 53
End: 2023-06-08
Payer: MEDICARE

## 2023-06-08 NOTE — TELEPHONE ENCOUNTER
Pt calling. She states that she has a sore left foot  on the bottom of foot and on the ball of foot there is a sore. Pt states that it is painful to walk or stand on. Been going on for about a month. Pt states that her  says that it looks like a blister but she is unsure as she cannot see it. Pt denies drainage or pus, redness, red streaks, swelling, fever, or discoloration of foot or toes.    Advised HC, when to call back and to be sen w/i 3 days. Transferred to [facility] for appt.    Brielle Arshad, RN, BSN  [facility] Nurse Advisor 10:18 AM 6/8/2023       Reason for Disposition    MODERATE pain (e.g., interferes with normal activities, limping) and present > 3 days    Additional Information    Negative: Followed an ankle or foot injury    Negative: Toe pain is main symptom    Negative: Ankle pain is main symptom    Negative: Entire foot is cool or blue in comparison to other foot    Negative: Purple or black skin on foot or toe    Negative: Red area or streak and fever    Negative: Swollen foot and fever    Negative: Patient sounds very sick or weak to the triager    Negative: SEVERE pain (e.g., excruciating, unable to do any normal activities)    Negative: Looks like a boil, infected sore, deep ulcer, or other infected rash (spreading redness, pus)    Negative: Swollen foot  (Exceptions: Localized bump from bunions, calluses, insect bite, sting.)    Negative: Weakness (i.e., loss of strength) of new-onset in foot or toes (Exceptions: Not truly weak, foot feels weak because of pain; weakness present > 2 weeks.)    Negative: Numbness (i.e., loss of sensation) in foot or toes (Exception: Just tingling; numbness present > 2 weeks.)    Protocols used: FOOT PAIN-A-OH

## 2023-06-09 ENCOUNTER — OFFICE VISIT (OUTPATIENT)
Dept: FAMILY MEDICINE | Facility: CLINIC | Age: 53
End: 2023-06-09
Payer: MEDICARE

## 2023-06-09 ENCOUNTER — ANCILLARY PROCEDURE (OUTPATIENT)
Dept: GENERAL RADIOLOGY | Facility: CLINIC | Age: 53
End: 2023-06-09
Attending: FAMILY MEDICINE
Payer: MEDICARE

## 2023-06-09 VITALS
HEART RATE: 69 BPM | BODY MASS INDEX: 26.06 KG/M2 | RESPIRATION RATE: 20 BRPM | OXYGEN SATURATION: 99 % | TEMPERATURE: 98.9 F | SYSTOLIC BLOOD PRESSURE: 102 MMHG | HEIGHT: 65 IN | WEIGHT: 156.4 LBS | DIASTOLIC BLOOD PRESSURE: 64 MMHG

## 2023-06-09 DIAGNOSIS — M79.672 LEFT FOOT PAIN: Primary | ICD-10-CM

## 2023-06-09 DIAGNOSIS — M79.672 LEFT FOOT PAIN: ICD-10-CM

## 2023-06-09 DIAGNOSIS — Z12.11 SCREEN FOR COLON CANCER: ICD-10-CM

## 2023-06-09 PROCEDURE — 99213 OFFICE O/P EST LOW 20 MIN: CPT | Performed by: FAMILY MEDICINE

## 2023-06-09 PROCEDURE — 73630 X-RAY EXAM OF FOOT: CPT | Mod: TC | Performed by: RADIOLOGY

## 2023-06-09 NOTE — PROGRESS NOTES
Assessment & Plan     Left foot pain  Advised Good shoes   advil otc  If not better see Podiatrist  Use shoe inserts as discussed   - XR Foot Left G/E 3 Views; Future    Screen for colon cancer  Advised shadi as she has Family history colon cancer   - Colonoscopy Screening  Referral; Future  Follow up if not better  Leydi Corea MD  [facility] CARLA Oreilly is a 52 year old, presenting for the following health issues:  pain on left foot        6/9/2023     1:57 PM   Additional Questions   Roomed by Christie   Accompanied by none     History of Present Illness       Reason for visit:  Pain on ball of left foot  Symptom onset:  3-4 weeks ago  Symptoms include:  Pain and tenderness especially when walking  Symptom intensity:  Moderate  Symptom progression:  Staying the same  Had these symptoms before:  No  What makes it worse:  Putting pressure on it  What makes it better:  Not putting pressure on it    She eats 0-1 servings of fruits and vegetables daily.She consumes 1 sweetened beverage(s) daily.She exercises with enough effort to increase her heart rate 20 to 29 minutes per day.  She exercises with enough effort to increase her heart rate 5 days per week.   She is taking medications regularly.         Onset/Duration: 4 weeks. Patient thinks pain maybe from playing pickle ball with her  for 5 hours.   Description  Location: Left foot   Joint Swelling: No.  Redness: No.  Pain: YES. Dull ache.   Warmth: No.  Intensity: 8 /10 on pain scale while walking.   Progression of Symptoms:  constant  Accompanying signs and symptoms:   Fevers: No  Numbness/tingling/weakness: No  History  Trauma to the area: No  Recent illness:  No  Previous similar problem: No  Previous evaluation:  No  Precipitating or alleviating factors:  Aggravating factors include: Walking is painful.   Therapies tried and outcome: None     Review of Systems   Rest of the ROS is Negative except see above and  "Problem list [stable]        Objective    /64   Pulse 69   Temp 98.9  F (37.2  C) (Temporal)   Resp 20   Ht 1.65 m (5' 4.96\")   Wt 70.9 kg (156 lb 6.4 oz)   LMP 07/06/2017   SpO2 99%   BMI 26.06 kg/m    Body mass index is 26.06 kg/m .  Physical Exam   GENERAL: healthy, alert and no distress  Left foot Mild tenderness metatarsal area -medial aspect  No swelling  Distal CMS is Intact            "

## 2023-07-20 ENCOUNTER — TELEPHONE (OUTPATIENT)
Dept: FAMILY MEDICINE | Facility: CLINIC | Age: 53
End: 2023-07-20
Payer: MEDICARE

## 2023-07-20 NOTE — LETTER
July 20, 2023    Denilson Sweet  [address] [place] [id number]    Dear Denilson Sweet,     At [facility] we care about your health and are committed to providing quality patient care.    Which includes staying current on preventive cancer screenings.  You can increase your chances of finding and treating cancers through regular screenings.      Our records indicate you may be due for the following preventive screening(s):    Colon Cancer Screening      Topic Date Due     Hepatitis B Vaccine (1 of 3 - 3-dose series) Never done     COVID-19 Vaccine (1) Never done     Zoster (Shingles) Vaccine (1 of 2) Never done       To schedule an appointment or discuss this screening further, you may contact us by phone at the [facility] at [phone] or online through the patient [email or web address]    If you have had any of the screenings listed above at another facility, please call us so that we may update your chart.      Your partners in health,      Quality Committee at [facility]

## 2023-07-20 NOTE — TELEPHONE ENCOUNTER
Patient Quality Outreach    Patient is due for the following:   Colon Cancer Screening      Topic Date Due     Hepatitis B Vaccine (1 of 3 - 3-dose series) Never done     COVID-19 Vaccine (1) Never done     Zoster (Shingles) Vaccine (1 of 2) Never done       Next Steps:   Schedule a office visit for items above     Type of outreach:    Sent letter.      Questions for provider review:    None     Flower Collins MA

## 2023-12-13 ENCOUNTER — TELEPHONE (OUTPATIENT)
Dept: FAMILY MEDICINE | Facility: CLINIC | Age: 53
End: 2023-12-13
Payer: MEDICARE

## 2023-12-13 NOTE — LETTER
December 13, 2023    Denilson Sweet  [address] [place] [id number]    Dear Denilson    At [facility] we care about your health and are committed to providing quality patient care.     Here is a list of Health Maintenance topics that are due now or due soon:  Health Maintenance Due   Topic Date Due    MIGRAINE ACTION PLAN  Never done    HEPATITIS B IMMUNIZATION (1 of 3 - 3-dose series) Never done    COVID-19 Vaccine (1) Never done    COLORECTAL CANCER SCREENING  Never done    HIV SCREENING  Never done    HEPATITIS C SCREENING  Never done    ZOSTER IMMUNIZATION (1 of 2) Never done    INFLUENZA VACCINE (1) Never done        We are recommending that you:  Schedule a COLONOSCOPY to assess for colon cancer (due every 10 years or 5 years in higher risk situations.)       Colon cancer is now the second leading cause of cancer-related deaths in the [place] for both men and women and there are over 130,000 new cases and 50,000 deaths per year from colon cancer.  Colonoscopies can prevent 90-95% of these deaths.  Problem lesions can be removed before they ever become cancer.  This test is not only looking for cancer, but also getting rid of precancerious lesions.    If you are under/uninsured, we recommend you contact the [employer] program. [employer] is a free colorectal cancer screening program that provides colonoscopies for eligible under/uninsured [place] men and women. If you are interested in receiving a free colonoscopy, please call [employer] at [phone] (mention code [employer]) to see if you re eligible.     If you do not wish to do a colonoscopy or cannot afford to do one, at this time, there is another option. It is called a FIT test or Fecal Immunochemical Occult Blood Test (take home stool sample kit).  It does not replace the colonoscopy for colorectal cancer screening, but it can detect hidden bleeding in the lower colon.  It does need to be  repeated every year and if a positive result is obtained, you would be referred for a colonoscopy.    If you have completed either one of these tests at another facility, please call/respond to this message with the details of when and where the tests were done and if they were normal or not. Or have the records sent to our clinic so that we can best coordinate your care. and   Schedule a Nurse-Only appointment to update your immunizations: Your records indicate that you are not up to date with your immunizations, please schedule a nurse-only appointment to get these updated or update them at your next office visit. If this is incorrect, please disregard.    To schedule an appointment or discuss this further, you may contact us by phone at the [facility] at [phone] or online through the patient portal/[email or web address] @ [email or web address]    Thank you for trusting [facility] and we appreciate the opportunity to serve you.  We look forward to supporting your healthcare needs in the future.    Your partners in health,      Quality Committee at [facility]

## 2023-12-13 NOTE — TELEPHONE ENCOUNTER
Patient Quality Outreach    Patient is due for the following:   Colon Cancer Screening      Topic Date Due    Hepatitis B Vaccine (1 of 3 - 3-dose series) Never done    COVID-19 Vaccine (1) Never done    Zoster (Shingles) Vaccine (1 of 2) Never done    Flu Vaccine (1) Never done       Next Steps:   Schedule a nurse only visit for vaccines    Type of outreach:    Sent letter.      Questions for provider review:    None           Julianna Rankin MA

## 2024-01-09 ENCOUNTER — NURSE TRIAGE (OUTPATIENT)
Dept: FAMILY MEDICINE | Facility: CLINIC | Age: 54
End: 2024-01-09
Payer: MEDICARE

## 2024-01-09 NOTE — TELEPHONE ENCOUNTER
"Reason for Disposition   Ear congestion    Additional Information   Negative: Ear pain is main symptom   Negative: Hearing loss (complete or partial) is main symptom   Negative: Earwax is main concern   Negative: Has nasal allergies and they are acting up   Negative: Earache lasts > 1 hour   Negative: Pus or cloudy discharge from ear canal   Negative: Patient wants to be seen   Negative: Ear congestion present > 48 hours    Answer Assessment - Initial Assessment Questions  1. LOCATION: \"Which ear is involved?\"        R ear. None. Can't hear out of ear  2. SENSATION: \"Describe how the ear feels.\" (e.g. stuffy, full, plugged).\"       Plugged.   3. ONSET:  \"When did the ear symptoms start?\"        Yesterday  4. PAIN: \"Do you also have an earache?\" If Yes, ask: \"How bad is it?\" (Scale 1-10; or mild, moderate, severe)      None  5. CAUSE: \"What do you think is causing the ear congestion?\"      Pt has upper respiratory infection  6. URI: \"Do you have a runny nose or cough?\"       Yes  7. NASAL ALLERGIES: \"Are there symptoms of hay fever, such as sneezing or a clear nasal discharge?\"      none  8. PREGNANCY: \"Is there any chance you are pregnant?\" \"When was your last menstrual period?\"      none    Protocols used: Ear - Congestion-A-OH    "

## 2024-02-05 ENCOUNTER — PATIENT OUTREACH (OUTPATIENT)
Dept: CARE COORDINATION | Facility: CLINIC | Age: 54
End: 2024-02-05
Payer: MEDICARE

## 2024-06-02 ENCOUNTER — HEALTH MAINTENANCE LETTER (OUTPATIENT)
Age: 54
End: 2024-06-02

## 2025-02-10 ENCOUNTER — PATIENT OUTREACH (OUTPATIENT)
Dept: CARE COORDINATION | Facility: CLINIC | Age: 55
End: 2025-02-10
Payer: MEDICARE

## 2025-05-19 SDOH — HEALTH STABILITY: PHYSICAL HEALTH: ON AVERAGE, HOW MANY DAYS PER WEEK DO YOU ENGAGE IN MODERATE TO STRENUOUS EXERCISE (LIKE A BRISK WALK)?: 5 DAYS

## 2025-05-19 SDOH — HEALTH STABILITY: PHYSICAL HEALTH: ON AVERAGE, HOW MANY MINUTES DO YOU ENGAGE IN EXERCISE AT THIS LEVEL?: 50 MIN

## 2025-05-19 ASSESSMENT — SOCIAL DETERMINANTS OF HEALTH (SDOH): HOW OFTEN DO YOU GET TOGETHER WITH FRIENDS OR RELATIVES?: ONCE A WEEK

## 2025-05-20 ENCOUNTER — TELEPHONE (OUTPATIENT)
Dept: FAMILY MEDICINE | Facility: CLINIC | Age: 55
End: 2025-05-20

## 2025-05-20 ENCOUNTER — OFFICE VISIT (OUTPATIENT)
Dept: FAMILY MEDICINE | Facility: CLINIC | Age: 55
End: 2025-05-20
Payer: MEDICARE

## 2025-05-20 ENCOUNTER — TELEPHONE (OUTPATIENT)
Dept: GASTROENTEROLOGY | Facility: CLINIC | Age: 55
End: 2025-05-20

## 2025-05-20 ENCOUNTER — ANCILLARY PROCEDURE (OUTPATIENT)
Dept: GENERAL RADIOLOGY | Facility: CLINIC | Age: 55
End: 2025-05-20
Attending: PHYSICIAN ASSISTANT
Payer: MEDICARE

## 2025-05-20 VITALS
RESPIRATION RATE: 16 BRPM | WEIGHT: 157.2 LBS | BODY MASS INDEX: 26.19 KG/M2 | HEART RATE: 70 BPM | DIASTOLIC BLOOD PRESSURE: 72 MMHG | SYSTOLIC BLOOD PRESSURE: 110 MMHG | OXYGEN SATURATION: 99 % | HEIGHT: 65 IN | TEMPERATURE: 97.4 F

## 2025-05-20 DIAGNOSIS — M21.611 BILATERAL BUNIONS: ICD-10-CM

## 2025-05-20 DIAGNOSIS — F31.9 BIPOLAR 1 DISORDER (H): ICD-10-CM

## 2025-05-20 DIAGNOSIS — Z13.6 SCREENING FOR CARDIOVASCULAR CONDITION: ICD-10-CM

## 2025-05-20 DIAGNOSIS — E04.9 ENLARGEMENT OF THYROID: ICD-10-CM

## 2025-05-20 DIAGNOSIS — Z12.11 SCREEN FOR COLON CANCER: ICD-10-CM

## 2025-05-20 DIAGNOSIS — M21.612 BILATERAL BUNIONS: ICD-10-CM

## 2025-05-20 DIAGNOSIS — Z11.4 SCREENING FOR HIV (HUMAN IMMUNODEFICIENCY VIRUS): ICD-10-CM

## 2025-05-20 DIAGNOSIS — Z12.31 VISIT FOR SCREENING MAMMOGRAM: ICD-10-CM

## 2025-05-20 DIAGNOSIS — Z11.59 NEED FOR HEPATITIS C SCREENING TEST: ICD-10-CM

## 2025-05-20 DIAGNOSIS — Z00.00 ENCOUNTER FOR MEDICARE ANNUAL WELLNESS EXAM: ICD-10-CM

## 2025-05-20 DIAGNOSIS — K60.2 ANAL FISSURE: ICD-10-CM

## 2025-05-20 DIAGNOSIS — K64.4 ANAL SKIN TAG: ICD-10-CM

## 2025-05-20 DIAGNOSIS — Z12.31 VISIT FOR SCREENING MAMMOGRAM: Primary | ICD-10-CM

## 2025-05-20 DIAGNOSIS — Z23 NEED FOR VACCINATION: ICD-10-CM

## 2025-05-20 DIAGNOSIS — S76.819A STRAIN OF ILIOPSOAS MUSCLE, UNSPECIFIED LATERALITY, INITIAL ENCOUNTER: ICD-10-CM

## 2025-05-20 DIAGNOSIS — Z00.00 ENCOUNTER FOR MEDICARE ANNUAL WELLNESS EXAM: Primary | ICD-10-CM

## 2025-05-20 DIAGNOSIS — G43.009 MIGRAINE WITHOUT AURA AND WITHOUT STATUS MIGRAINOSUS, NOT INTRACTABLE: ICD-10-CM

## 2025-05-20 DIAGNOSIS — Z23 NEED FOR SHINGLES VACCINE: ICD-10-CM

## 2025-05-20 DIAGNOSIS — Z13.220 LIPID SCREENING: ICD-10-CM

## 2025-05-20 LAB
ERYTHROCYTE [DISTWIDTH] IN BLOOD BY AUTOMATED COUNT: 11.5 % (ref 10–15)
HCT VFR BLD AUTO: 40.2 % (ref 35–47)
HGB BLD-MCNC: 12.9 G/DL (ref 11.7–15.7)
HIV 1+2 AB+HIV1 P24 AG SERPL QL IA: NONREACTIVE
MCH RBC QN AUTO: 31.1 PG (ref 26.5–33)
MCHC RBC AUTO-ENTMCNC: 32.1 G/DL (ref 31.5–36.5)
MCV RBC AUTO: 97 FL (ref 78–100)
PLATELET # BLD AUTO: 247 10E3/UL (ref 150–450)
RBC # BLD AUTO: 4.15 10E6/UL (ref 3.8–5.2)
WBC # BLD AUTO: 6.8 10E3/UL (ref 4–11)

## 2025-05-20 PROCEDURE — 80061 LIPID PANEL: CPT | Performed by: PHYSICIAN ASSISTANT

## 2025-05-20 PROCEDURE — 80048 BASIC METABOLIC PNL TOTAL CA: CPT | Performed by: PHYSICIAN ASSISTANT

## 2025-05-20 PROCEDURE — G2211 COMPLEX E/M VISIT ADD ON: HCPCS | Performed by: PHYSICIAN ASSISTANT

## 2025-05-20 PROCEDURE — 73630 X-RAY EXAM OF FOOT: CPT | Mod: TC | Performed by: RADIOLOGY

## 2025-05-20 PROCEDURE — G0439 PPPS, SUBSEQ VISIT: HCPCS | Performed by: PHYSICIAN ASSISTANT

## 2025-05-20 PROCEDURE — 85027 COMPLETE CBC AUTOMATED: CPT | Performed by: PHYSICIAN ASSISTANT

## 2025-05-20 PROCEDURE — 3074F SYST BP LT 130 MM HG: CPT | Performed by: PHYSICIAN ASSISTANT

## 2025-05-20 PROCEDURE — 99214 OFFICE O/P EST MOD 30 MIN: CPT | Mod: 25 | Performed by: PHYSICIAN ASSISTANT

## 2025-05-20 PROCEDURE — 84443 ASSAY THYROID STIM HORMONE: CPT | Performed by: PHYSICIAN ASSISTANT

## 2025-05-20 PROCEDURE — 87389 HIV-1 AG W/HIV-1&-2 AB AG IA: CPT | Performed by: PHYSICIAN ASSISTANT

## 2025-05-20 PROCEDURE — 36415 COLL VENOUS BLD VENIPUNCTURE: CPT | Performed by: PHYSICIAN ASSISTANT

## 2025-05-20 PROCEDURE — 3078F DIAST BP <80 MM HG: CPT | Performed by: PHYSICIAN ASSISTANT

## 2025-05-20 RX ORDER — SUMATRIPTAN SUCCINATE 25 MG/1
25-50 TABLET ORAL
Qty: 30 TABLET | Refills: 1 | Status: SHIPPED | OUTPATIENT
Start: 2025-05-20

## 2025-05-20 RX ORDER — METHOCARBAMOL 500 MG/1
500 TABLET, FILM COATED ORAL 4 TIMES DAILY PRN
Qty: 30 TABLET | Refills: 1 | Status: SHIPPED | OUTPATIENT
Start: 2025-05-20 | End: 2025-05-20

## 2025-05-20 NOTE — TELEPHONE ENCOUNTER
"Endoscopy Scheduling Screen    Caller: patient    Have you had any respiratory illness or flu-like symptoms in the last 10 days?  No    What is your communication preference for Instructions and/or Bowel Prep?   Reggie    What insurance is in the chart?  Other:  MEDICARE    Ordering/Referring Provider: VENECIA CORREA   (If ordering provider performs procedure, schedule with ordering provider unless otherwise instructed. )    BMI: Estimated body mass index is 26.19 kg/m  as calculated from the following:    Height as of an earlier encounter on 5/20/25: 1.65 m (5' 4.96\").    Weight as of an earlier encounter on 5/20/25: 71.3 kg (157 lb 3.2 oz).     Sedation Ordered  moderate sedation.   If patient BMI > 50 do not schedule in [facility].    If patient BMI > 45 do not schedule at [facility].    Are you taking methadone or Suboxone?  NO, No RN review required.    Have you been diagnosed and are being treated for severe PTSD or severe anxiety?  NO, No RN review required.    Are you taking any prescription medications for pain 3 or more times per week?   NO, No RN review required.    Do you have a history of malignant hyperthermia?  No    (Females) Are you currently pregnant?   No     Have you been diagnosed or told you have pulmonary hypertension?   No    Do you have an LVAD?  No    Have you been told you have moderate to severe sleep apnea?  No.    Have you been told you have COPD, asthma, or any other lung disease?  No    Has your doctor ordered any cardiac tests like echo, angiogram, stress test, ablation, or EKG, that you have not completed yet?  No    Do you  have a history of any heart conditions?  No     Have you ever had or are you waiting for an organ transplant?  No. Continue scheduling, no site restrictions.    Have you had a stroke or transient ischemic attack (TIA aka \"mini stroke\") in the last 2 years?   No.    Have you been diagnosed with or been told you have cirrhosis of the liver?   No.    Are you currently " "on dialysis?   No    Do you need assistance transferring?   No    BMI: Estimated body mass index is 26.19 kg/m  as calculated from the following:    Height as of an earlier encounter on 5/20/25: 1.65 m (5' 4.96\").    Weight as of an earlier encounter on 5/20/25: 71.3 kg (157 lb 3.2 oz).     Is patients BMI > 40 and scheduling location [facility]?  No    Do you take an injectable or oral medication for weight loss or diabetes (excluding insulin)?  No    Do you take the medication Naltrexone?  No    Do you take blood thinners?  No       Prep   Are you currently on dialysis or do you have chronic kidney disease?  No    Do you have a diagnosis of diabetes?  No    Do you have a diagnosis of cystic fibrosis (CF)?  No    On a regular basis do you go 3 -5 days between bowel movements?  No    BMI > 40?  No    Preferred Pharmacy:      [facility]/pharmacy #[id number] [place] [address] [address]  Phone: [phone] Fax: [phone]      Final Scheduling Details     Procedure scheduled  Colonoscopy    Surgeon:  MERRILL     Date of procedure:  6/4/25     Pre-OP / PAC:   No - Not required for this site.    Location  [facility] - Patient preference.    Sedation   Moderate Sedation - Per order.      Patient Reminders:   You will receive a call from a Nurse to review instructions and health history.  This assessment must be completed prior to your procedure.  Failure to complete the Nurse assessment may result in the procedure being cancelled.      On the day of your procedure, please designate an adult(s) who can drive you home stay with you for the next 24 hours. The medicines used in the exam will make you sleepy. You will not be able to drive.      You cannot take public transportation, ride share services, or non-medical taxi service without a responsible caregiver.  Medical transport services are allowed with the requirement that a responsible caregiver will receive you at your " destination.  We require that drivers and caregivers are confirmed prior to your procedure.

## 2025-05-20 NOTE — PATIENT INSTRUCTIONS
At [facility], we strive to deliver an exceptional experience to you, every time we see you. If you receive a survey, please let us know what we are doing well and/or what we could improve upon, as we do value your feedback.  If you have MyChart, you can expect to receive results automatically within 24 hours of their completion.  Your provider will send a note interpreting your results as well.   If you do not have MyChart, you should receive your results in about a week by mail.    Your care team:                            Family Medicine Internal Medicine   MD Jaswant Arias, MD Sole Martinez, MD Louis Mishra, MD Benita Carr, PA-C    Jeffy Franklin, MD Pediatrics   Parisa Baugh, MD Joceline Camacho, MD Cece Rincon, APRN CNP Breanna DE LUNA CNP   Jocy Rhodes, MD Marah Cordero, MD Nanda Lewis, CNP     Irena Aly, PA-C Same-Day Provider (No follow-up visits)   CALIXTO Lovett, NATALEE Conner, PA-C    Rebecca Khanna PA-C     Clinic hours: Monday - Thursday 7 am-6 pm; Fridays 7 am-5 pm.   Urgent care: Monday - Friday 10 am- 8 pm; Saturday and Sunday 9 am-5 pm.    Clinic: [phone]       [facility]: Monday - Thursday 8 am - 7 pm; Friday 8 am - 6 pm  [facility]: [phone]              Patient Education   Preventive Care Advice   This is general advice given by our system to help you stay healthy. However, your care team may have specific advice just for you. Please talk to your care team about your preventive care needs.  Nutrition  Eat 5 or more servings of fruits and vegetables each day.  Try wheat bread, brown rice and whole grain pasta (instead of white bread, rice, and pasta).  Get enough calcium and vitamin D. Check the label on foods and aim for 100% of the RDA (recommended daily allowance).  Lifestyle  Exercise at least 150 minutes each  week  (30 minutes a day, 5 days a week).  Do muscle strengthening activities 2 days a week. These help control your weight and prevent disease.  No smoking.  Wear sunscreen to prevent skin cancer.  Have a dental exam and cleaning every 6 months.  Yearly exams  See your health care team every year to talk about:  Any changes in your health.  Any medicines your care team has prescribed.  Preventive care, family planning, and ways to prevent chronic diseases.  Shots (vaccines)   HPV shots (up to age 26), if you've never had them before.  Hepatitis B shots (up to age 59), if you've never had them before.  COVID-19 shot: Get this shot when it's due.  Flu shot: Get a flu shot every year.  Tetanus shot: Get a tetanus shot every 10 years.  Pneumococcal, hepatitis A, and RSV shots: Ask your care team if you need these based on your risk.  Shingles shot (for age 50 and up)  General health tests  Diabetes screening:  Starting at age 35, Get screened for diabetes at least every 3 years.  If you are younger than age 35, ask your care team if you should be screened for diabetes.  Cholesterol test: At age 39, start having a cholesterol test every 5 years, or more often if advised.  Bone density scan (DEXA): At age 50, ask your care team if you should have this scan for osteoporosis (brittle bones).  Hepatitis C: Get tested at least once in your life.  STIs (sexually transmitted infections)  Before age 24: Ask your care team if you should be screened for STIs.  After age 24: Get screened for STIs if you're at risk. You are at risk for STIs (including HIV) if:  You are sexually active with more than one person.  You don't use condoms every time.  You or a partner was diagnosed with a sexually transmitted infection.  If you are at risk for HIV, ask about PrEP medicine to prevent HIV.  Get tested for HIV at least once in your life, whether you are at risk for HIV or not.  Cancer screening tests  Cervical cancer screening: If you have  a cervix, begin getting regular cervical cancer screening tests starting at age 21.  Breast cancer scan (mammogram): If you've ever had breasts, begin having regular mammograms starting at age 40. This is a scan to check for breast cancer.  Colon cancer screening: It is important to start screening for colon cancer at age 45.  Have a colonoscopy test every 10 years (or more often if you're at risk) Or, ask your provider about stool tests like a FIT test every year or Cologuard test every 3 years.  To learn more about your testing options, visit:   .  For help making a decision, visit:   https://bit.ly/yq85933.  Prostate cancer screening test: If you have a prostate, ask your care team if a prostate cancer screening test (PSA) at age 55 is right for you.  Lung cancer screening: If you are a current or former smoker ages 50 to 80, ask your care team if ongoing lung cancer screenings are right for you.  For informational purposes only. Not to replace the advice of your health care provider. Copyright   2023 [facility]. All rights reserved. Clinically reviewed by the [facility] Transitions Program. [employer] 187344 - REV 01/24.  Substance Use Disorder: Care Instructions  Overview     You can improve your life and health by stopping your use of alcohol or drugs. When you don't drink or use drugs, you may feel and sleep better. You may get along better with your family, friends, and coworkers. There are medicines and programs that can help with substance use disorder.  How can you care for yourself at home?  Here are some ways to help you stay sober and prevent relapse.  If you have been given medicine to help keep you sober or reduce your cravings, be sure to take it exactly as prescribed.  Talk to your doctor about programs that can help you stop using drugs or drinking alcohol.  Do not keep alcohol or drugs in your home.  Plan ahead. Think about what you'll say if other people ask you to drink or  use drugs. Try not to spend time with people who drink or use drugs.  Use the time and money spent on drinking or drugs to do something that's important to you.  Preventing a relapse  Have a plan to deal with relapse. Learn to recognize changes in your thinking that lead you to drink or use drugs. Get help before you start to drink or use drugs again.  Try to stay away from situations, friends, or places that may lead you to drink or use drugs.  If you feel the need to drink alcohol or use drugs again, seek help right away. Call a trusted friend or family member. Some people get support from organizations such as Narcotics Anonymous or [employer] or from treatment facilities.  If you relapse, get help as soon as you can. Some people make a plan with another person that outlines what they want that person to do for them if they relapse. The plan usually includes how to handle the relapse and who to notify in case of relapse.  Don't give up. Remember that a relapse doesn't mean that you have failed. Use the experience to learn the triggers that lead you to drink or use drugs. Then quit again. Recovery is a lifelong process. Many people have several relapses before they are able to quit for good.  Follow-up care is a key part of your treatment and safety. Be sure to make and go to all appointments, and call your doctor if you are having problems. It's also a good idea to know your test results and keep a list of the medicines you take.  When should you call for help?   Call 911  anytime you think you may need emergency care. For example, call if you or someone else:    Has overdosed or has withdrawal signs. Be sure to tell the emergency workers that you are or someone else is using or trying to quit using drugs. Overdose or withdrawal signs may include:  Losing consciousness.  Seizure.  Seeing or hearing things that aren't there (hallucinations).     Is thinking or talking about suicide or harming others.   Where  "to get help 24 hours a day, 7 days a week   If you or someone you know talks about suicide, self-harm, a mental health crisis, a substance use crisis, or any other kind of emotional distress, get help right away. You can:    Call the Suicide and Crisis Lifeline at 988.     Call 9-000-272-TALK (1-294.211.2586).     Text HOME to 371280 to access the Crisis Text Line.   Consider saving these numbers in your phone.  Go to [employer] for more information or to chat online.  Call your doctor now or seek immediate medical care if:    You are having withdrawal symptoms. These may include nausea or vomiting, sweating, shakiness, and anxiety.   Watch closely for changes in your health, and be sure to contact your doctor if:    You have a relapse.     You need more help or support to stop.   Where can you learn more?  Go to https://www.[employer]/patiented  Enter H573 in the search box to learn more about \"Substance Use Disorder: Care Instructions.\"  Current as of: August 20, 2024  Content Version: 14.4    6751-1609 [employer].   Care instructions adapted under license by your healthcare professional. If you have questions about a medical condition or this instruction, always ask your healthcare professional. [employer] disclaims any warranty or liability for your use of this information.       Patient Education   Preventive Care Advice   This is general advice given by our system to help you stay healthy. However, your care team may have specific advice just for you. Please talk to your care team about your preventive care needs.  Nutrition  Eat 5 or more servings of fruits and vegetables each day.  Try wheat bread, brown rice and whole grain pasta (instead of white bread, rice, and pasta).  Get enough calcium and vitamin D. Check the label on foods and aim for 100% of the RDA (recommended daily allowance).  Lifestyle  Exercise at least 150 minutes each week  (30 minutes a day, 5 days a " week).  Do muscle strengthening activities 2 days a week. These help control your weight and prevent disease.  No smoking.  Wear sunscreen to prevent skin cancer.  Have a dental exam and cleaning every 6 months.  Yearly exams  See your health care team every year to talk about:  Any changes in your health.  Any medicines your care team has prescribed.  Preventive care, family planning, and ways to prevent chronic diseases.  Shots (vaccines)   HPV shots (up to age 26), if you've never had them before.  Hepatitis B shots (up to age 59), if you've never had them before.  COVID-19 shot: Get this shot when it's due.  Flu shot: Get a flu shot every year.  Tetanus shot: Get a tetanus shot every 10 years.  Pneumococcal, hepatitis A, and RSV shots: Ask your care team if you need these based on your risk.  Shingles shot (for age 50 and up)  General health tests  Diabetes screening:  Starting at age 35, Get screened for diabetes at least every 3 years.  If you are younger than age 35, ask your care team if you should be screened for diabetes.  Cholesterol test: At age 39, start having a cholesterol test every 5 years, or more often if advised.  Bone density scan (DEXA): At age 50, ask your care team if you should have this scan for osteoporosis (brittle bones).  Hepatitis C: Get tested at least once in your life.  STIs (sexually transmitted infections)  Before age 24: Ask your care team if you should be screened for STIs.  After age 24: Get screened for STIs if you're at risk. You are at risk for STIs (including HIV) if:  You are sexually active with more than one person.  You don't use condoms every time.  You or a partner was diagnosed with a sexually transmitted infection.  If you are at risk for HIV, ask about PrEP medicine to prevent HIV.  Get tested for HIV at least once in your life, whether you are at risk for HIV or not.  Cancer screening tests  Cervical cancer screening: If you have a cervix, begin getting regular  cervical cancer screening tests starting at age 21.  Breast cancer scan (mammogram): If you've ever had breasts, begin having regular mammograms starting at age 40. This is a scan to check for breast cancer.  Colon cancer screening: It is important to start screening for colon cancer at age 45.  Have a colonoscopy test every 10 years (or more often if you're at risk) Or, ask your provider about stool tests like a FIT test every year or Cologuard test every 3 years.  To learn more about your testing options, visit:   .  For help making a decision, visit:   https://bit.ly/kd76423.  Prostate cancer screening test: If you have a prostate, ask your care team if a prostate cancer screening test (PSA) at age 55 is right for you.  Lung cancer screening: If you are a current or former smoker ages 50 to 80, ask your care team if ongoing lung cancer screenings are right for you.  For informational purposes only. Not to replace the advice of your health care provider. Copyright   2023 [facility]. All rights reserved. Clinically reviewed by the [facility] Transitions Program. [employer] 818233 - REV 01/24.  Substance Use Disorder: Care Instructions  Overview     You can improve your life and health by stopping your use of alcohol or drugs. When you don't drink or use drugs, you may feel and sleep better. You may get along better with your family, friends, and coworkers. There are medicines and programs that can help with substance use disorder.  How can you care for yourself at home?  Here are some ways to help you stay sober and prevent relapse.  If you have been given medicine to help keep you sober or reduce your cravings, be sure to take it exactly as prescribed.  Talk to your doctor about programs that can help you stop using drugs or drinking alcohol.  Do not keep alcohol or drugs in your home.  Plan ahead. Think about what you'll say if other people ask you to drink or use drugs. Try not to spend time  with people who drink or use drugs.  Use the time and money spent on drinking or drugs to do something that's important to you.  Preventing a relapse  Have a plan to deal with relapse. Learn to recognize changes in your thinking that lead you to drink or use drugs. Get help before you start to drink or use drugs again.  Try to stay away from situations, friends, or places that may lead you to drink or use drugs.  If you feel the need to drink alcohol or use drugs again, seek help right away. Call a trusted friend or family member. Some people get support from organizations such as Narcotics Anonymous or [employer] or from treatment facilities.  If you relapse, get help as soon as you can. Some people make a plan with another person that outlines what they want that person to do for them if they relapse. The plan usually includes how to handle the relapse and who to notify in case of relapse.  Don't give up. Remember that a relapse doesn't mean that you have failed. Use the experience to learn the triggers that lead you to drink or use drugs. Then quit again. Recovery is a lifelong process. Many people have several relapses before they are able to quit for good.  Follow-up care is a key part of your treatment and safety. Be sure to make and go to all appointments, and call your doctor if you are having problems. It's also a good idea to know your test results and keep a list of the medicines you take.  When should you call for help?   Call 911  anytime you think you may need emergency care. For example, call if you or someone else:    Has overdosed or has withdrawal signs. Be sure to tell the emergency workers that you are or someone else is using or trying to quit using drugs. Overdose or withdrawal signs may include:  Losing consciousness.  Seizure.  Seeing or hearing things that aren't there (hallucinations).     Is thinking or talking about suicide or harming others.   Where to get help 24 hours a day, 7 days  "a week   If you or someone you know talks about suicide, self-harm, a mental health crisis, a substance use crisis, or any other kind of emotional distress, get help right away. You can:    Call the Suicide and Crisis Lifeline at 988.     Call 3-971-382-TALK (1-450.842.5590).     Text HOME to 449939 to access the Crisis Text Line.   Consider saving these numbers in your phone.  Go to [employer] for more information or to chat online.  Call your doctor now or seek immediate medical care if:    You are having withdrawal symptoms. These may include nausea or vomiting, sweating, shakiness, and anxiety.   Watch closely for changes in your health, and be sure to contact your doctor if:    You have a relapse.     You need more help or support to stop.   Where can you learn more?  Go to https://www.[employer].net/patiented  Enter H573 in the search box to learn more about \"Substance Use Disorder: Care Instructions.\"  Current as of: August 20, 2024  Content Version: 14.4    4300-7838 [employer]   Care instructions adapted under license by your healthcare professional. If you have questions about a medical condition or this instruction, always ask your healthcare professional. [employer] disclaims any warranty or liability for your use of this information.       "

## 2025-05-20 NOTE — TELEPHONE ENCOUNTER
methocarbamol (ROBAXIN) 500 MG tablet 30 tablet 1       Alternative requested: not covered. Please send alternative    TIZANIDINE HCL 2MG CAPSULE, CYCLOBENZAPRINE 5 MG TABLET, TIZANIDINE HCL 2 MG TABLET

## 2025-05-20 NOTE — TELEPHONE ENCOUNTER
Call received from patient. She had an appointment earlier today and her provider sent prescriptions to the pharmacy including prescriptions for vaccines. Patient is at the pharmacy and they have a prescription for the shingles vaccine but patient is sure her provider suggested 2 vaccines for her to get. Reviewed prescriptions, provider note and health maintenance. Health maintenance shows patient is also due for a pneumonia vaccine. Patient remembers this is the vaccine her provider mentioned and will request this from the pharmacy

## 2025-05-20 NOTE — PROGRESS NOTES
Preventive Care Visit  [facility] DAE PRIYA Carr PA-C, Family Medicine  May 20, 2025  {Provider  Link to [facility] :[id number]}    Assessment & Plan   Problem List Items Addressed This Visit          Nervous and Auditory    Migraine    Relevant Medications    SUMAtriptan (IMITREX) 25 MG tablet    methocarbamol (ROBAXIN) 500 MG tablet       Behavioral    Bipolar 1 disorder (H)     Other Visit Diagnoses         Visit for screening mammogram    -  Primary    Relevant Orders    MA Screening Bilateral w/ Marc      Screen for colon cancer        Relevant Orders    Colonoscopy Screening  Referral      Screening for HIV (human immunodeficiency virus)        Relevant Orders    HIV Antigen Antibody Combo      Need for hepatitis C screening test          Encounter for Medicare annual wellness exam          Lipid screening        Relevant Orders    Lipid panel reflex to direct LDL Non-fasting      Screening for cardiovascular condition        Relevant Orders    CBC with platelets (Completed)    Basic metabolic panel  (Ca, Cl, CO2, Creat, Gluc, K, Na, BUN)      Need for vaccination          Bilateral bunions        Relevant Medications    methocarbamol (ROBAXIN) 500 MG tablet    Other Relevant Orders    XR Foot Bilateral G/E 3 Views    Orthopedic  Referral      Need for shingles vaccine        Relevant Medications    zoster vaccine recombinant adjuvanted (SHINGRIX) injection      Enlargement of thyroid        Relevant Orders    US Thyroid    TSH with free T4 reflex      Anal skin tag        Relevant Orders    Adult Colorectal Surgery  Referral      Anal fissure        Relevant Orders    Adult Colorectal Surgery  Referral      Strain of iliopsoas muscle, unspecified laterality, initial encounter        Relevant Medications    methocarbamol (ROBAXIN) 500 MG tablet        Left thyroid nodule enlargement discovered on exam today. Patient will schedule thyroid  "ultrasound.   Anal fissure- wash with water and soap after BM, apply Desitin cream  Anal skin tag- follow up with colorectal surgeon.  Schedule colonoscopy.   Bilateral bunions- follow up with podiatry.   Pelvic muscle strain/tendonitis-avoid triggering activity or reduce intensity  Tizanidine 4 mg three times a day as needed  Ibuprofen 400 mg every 6 hours as needed    Patient has been advised of split billing requirements and indicates understanding: Yes       BMI  Estimated body mass index is 26.19 kg/m  as calculated from the following:    Height as of this encounter: 1.65 m (5' 4.96\").    Weight as of this encounter: 71.3 kg (157 lb 3.2 oz).   Weight management plan: Discussed healthy diet and exercise guidelines    Counseling  Appropriate preventive services were addressed with this patient via screening, questionnaire, or discussion as appropriate for fall prevention, nutrition, physical activity, Tobacco-use cessation, social engagement, weight loss and cognition.  Checklist reviewing preventive services available has been given to the patient.  Reviewed patient's diet, addressing concerns and/or questions.     Follow-up    Follow-up Visit   Expected date:  May 27, 2026 (Approximate)      Follow Up Appointment Details:     Follow-up with whom?: PCP    Follow-Up for what?: Medicare Wellness    Welcome or Annual?: Annual Wellness    How?: In Person             Follow-up Visit   Expected date:  May 27, 2026 (Approximate)      Follow Up Appointment Details:     Follow-up with whom?: PCP    Follow-Up for what?: Medicare Wellness    Welcome or Annual?: Annual Wellness    How?: In Person                 Graciela Oreilly is a 54 year old, presenting for the following:  Medicare Visit        5/20/2025     2:23 PM   Additional Questions   Roomed by Christie BRADLEY       Migraine   Since your last clinic visit, how have your headaches changed?  No change  How often are you getting headaches or migraines? 2 times " a week   Are you able to do normal daily activities when you have a migraine? Yes  Are you taking rescue/relief medications? (Select all that apply) Excedrin  How helpful is your rescue/relief medication?  I get some relief  Are you taking any medications to prevent migraines? (Select all that apply)  No  In the past 4 weeks, how often have you gone to urgent care or the emergency room because of your headaches?  0    Concern: hips and low back tightness after prolonged walking. Otherwise no pain. No radiation.    Concern: 2 hemorrhoids . Occasional streak of blood on toilet paper and itching of the area. No pain. Patient wants surgical removal.   Concern: patient has bilateral bunions that only painful after hiking. Patient wants a surgical consult for the Lapiplasty.  Advance Care Planning  {The storyboard will display whether the patient has ACP docs on file. Hover over the Code section in the storyboard to access the ACP documents. :[id number]}  Discussed advance care planning with patient; however, patient declined at this time.        5/19/2025   General Health   How would you rate your overall physical health? Good   Feel stress (tense, anxious, or unable to sleep) Only a little   (!) STRESS CONCERN      5/19/2025   Nutrition   Diet: Regular (no restrictions)         5/19/2025   Exercise   Days per week of moderate/strenous exercise 5 days   Average minutes spent exercising at this level 50 min         5/19/2025   Social Factors   Frequency of gathering with friends or relatives Once a week   Worry food won't last until get money to buy more No   Food not last or not have enough money for food? No   Do you have housing? (Housing is defined as stable permanent housing and does not include staying outside in a car, in a tent, in an abandoned building, in an overnight shelter, or couch-surfing.) Yes   Are you worried about losing your housing? No   Lack of transportation? No   Unable to get utilities  (heat,electricity)? No         5/19/2025   Fall Risk   Fallen 2 or more times in the past year? No   Trouble with walking or balance? No          5/19/2025   Activities of Daily Living- Home Safety   Needs help with the following daily activites None of the above   Safety concerns in the home None of the above         5/19/2025   Dental   Dentist two times every year? Yes         5/19/2025   Hearing Screening   Hearing concerns? None of the above         5/19/2025   Driving Risk Screening   Patient/family members have concerns about driving No         5/19/2025   General Alertness/Fatigue Screening   Have you been more tired than usual lately? No         5/19/2025   Urinary Incontinence Screening   Bothered by leaking urine in past 6 months No         Today's PHQ-2 Score:       5/19/2025     5:11 PM   PHQ-2 ( 1999 [employer])   Q1: Little interest or pleasure in doing things 0   Q2: Feeling down, depressed or hopeless 0   PHQ-2 Score 0    Q1: Little interest or pleasure in doing things Not at all   Q2: Feeling down, depressed or hopeless Not at all   PHQ-2 Score 0       Patient-reported           5/19/2025   Substance Use   Alcohol more than 3/day or more than 7/wk No   Do you have a current opioid prescription? No   How severe/bad is pain from 1 to 10? 0/10 (No Pain)   Do you use any other substances recreationally? (!) PRESCRIPTION DRUGS     Social History     Tobacco Use    Smoking status: Never     Passive exposure: Never    Smokeless tobacco: Never   Vaping Use    Vaping status: Never Used   Substance Use Topics    Alcohol use: No    Drug use: No     {Provider  If there are gaps in the social history shown above, please follow the link to update and then refresh the note Link to Social and Substance History :637508}      3/10/2023   LAST FHS-7 RESULTS   1st degree relative breast or ovarian cancer No    No   Any relative bilateral breast cancer No    No   Any male have breast cancer No    No   Any ONE woman have  BOTH breast AND ovarian cancer No    No   Any woman with breast cancer before 50yrs No    No   2 or more relatives with breast AND/OR ovarian cancer No    No   2 or more relatives with breast AND/OR bowel cancer No    No       Multiple values from one day are sorted in reverse-chronological order     {If any of the questions to the FHS7 are answered yes, consider referral for genetic counseling.    Additional indications for genetic referral include personal history of breast or ovarian cancer, genetic mutation in 1st degree relative which increases risk of breast cancer including BRCA1, BRCA2, JUSTINA, PALB 2, TP53, CHEK2, PTEN, CDH1, STK11 (per ACS) and/or 1st degree relative with history of pancreatic or high-risk prostate cancer (per NCCN):284039}   Mammogram Screening - Mammogram every 1-2 years updated in Health Maintenance based on mutual decision making          5/19/2025   One time HIV Screening   Previous HIV test? No     History of abnormal Pap smear: No - age 30- 64 PAP with HPV every 5 years recommended        Latest Ref Rng & Units 3/6/2023    10:28 AM 1/25/2018     3:15 PM 1/25/2018     2:44 PM   PAP / HPV   PAP  Negative for Intraepithelial Lesion or Malignancy (NILM)      PAP (Historical)    NIL    HPV 16 DNA Negative Negative  Negative     HPV 18 DNA Negative Negative  Negative     Other HR HPV Negative Negative  Negative       ASCVD Risk   The 10-year ASCVD risk score (Julio Cesar WASHINGTON, et al., 2019) is: 1.1%    Values used to calculate the score:      Age: 54 years      Sex: Female      Is Non- : No      Diabetic: No      Tobacco smoker: No      Systolic Blood Pressure: 110 mmHg      Is BP treated: No      HDL Cholesterol: 80 mg/dL      Total Cholesterol: 213 mg/dL        {Provider  REQUIRED FOR AWV Use the storyboard to review patient history, after sections have been marked as reviewed, refresh note to capture documentation:[id number]}  {Provider   REQUIRED AWV use this link  to review and update sexual activity history  after section has been marked as reviewed, refresh note to capture documentation:401489}  Reviewed and updated as needed this visit by Provider   Tobacco  Allergies  Meds  Problems  Med Hx  Surg Hx  Fam Hx            Patient Active Problem List   Diagnosis    Genital herpes    CARDIOVASCULAR SCREENING; LDL GOAL LESS THAN 160    Bipolar 1 disorder (H)    Migraine    Closed displaced fracture of head of right radius, initial encounter     Past Surgical History:   Procedure Laterality Date    BIOPSY BREAST      RT    LAPAROSCOPY PROCEDURE UNLISTED      ABLATION       Social History     Tobacco Use    Smoking status: Never     Passive exposure: Never    Smokeless tobacco: Never   Substance Use Topics    Alcohol use: No     Family History   Problem Relation Age of Onset    Osteoporosis Mother     Cancer - colorectal Paternal Grandmother          Current Outpatient Medications   Medication Sig Dispense Refill    CALCIUM 600 + D OR None Entered      lithium ER (LITHOBID/ESKALITH CR) 300 MG CR tablet Take 300 mg by mouth. Once a day      methocarbamol (ROBAXIN) 500 MG tablet Take 1 tablet (500 mg) by mouth 4 times daily as needed for muscle spasms. 30 tablet 1    SUMAtriptan (IMITREX) 25 MG tablet Take 1-2 tablets (25-50 mg) by mouth at onset of headache for migraine. May repeat dose in 2 hours.  Do not exceed 200 mg in 24 hours 30 tablet 1    zolpidem (AMBIEN) 5 MG tablet Take 10 mg by mouth nightly as needed for sleep       zoster vaccine recombinant adjuvanted (SHINGRIX) injection Inject 0.5 mLs into the muscle once for 1 dose. Pharmacist administered 0.5 mL 0     Allergies   Allergen Reactions    Valproic Acid Rash    Lamotrigine      PN: Anxiety, mood swings, manic symptoms     Current providers sharing in care for this patient include:  Patient Care Team:  Rosaline Carr PA-C as PCP - General (Physician Assistant)  Rosaline Carr PA-C as Assigned  "PCP    The following health maintenance items are reviewed in Epic and correct as of today:  Health Maintenance   Topic Date Due    MIGRAINE ACTION PLAN  Never done    COLORECTAL CANCER SCREENING  Never done    HIV SCREENING  Never done    HEPATITIS C SCREENING  Never done    HEPATITIS B IMMUNIZATION (1 of 3 - 19+ 3-dose series) Never done    Pneumococcal Vaccine: 50+ Years (1 of 1 - PCV) Never done    ZOSTER IMMUNIZATION (1 of 2) Never done    COVID-19 Vaccine (1 - 2024-25 season) Never done    MAMMO SCREENING  03/10/2025    INFLUENZA VACCINE (Season Ended) 09/01/2025    ADVANCE CARE PLANNING  09/15/2025    DIABETES SCREENING  03/06/2026    MEDICARE ANNUAL WELLNESS VISIT  05/20/2026    ANNUAL REVIEW OF HM ORDERS  05/20/2026    DTAP/TDAP/TD IMMUNIZATION (4 - Td or Tdap) 06/01/2027    HPV TEST  03/06/2028    LIPID  03/06/2028    PAP  03/06/2028    PHQ-2 (once per calendar year)  Completed    HPV IMMUNIZATION  Aged Out    MENINGITIS IMMUNIZATION  Aged Out         Review of Systems  Constitutional, HEENT, cardiovascular, pulmonary, gi and gu systems are negative, except as otherwise noted.     Objective    Exam  /72   Pulse 70   Temp 97.4  F (36.3  C)   Resp 16   Ht 1.65 m (5' 4.96\")   Wt 71.3 kg (157 lb 3.2 oz)   LMP 07/06/2017   SpO2 99%   BMI 26.19 kg/m     Estimated body mass index is 26.19 kg/m  as calculated from the following:    Height as of this encounter: 1.65 m (5' 4.96\").    Weight as of this encounter: 71.3 kg (157 lb 3.2 oz).    Physical Exam  GENERAL: alert and no distress  EYES: Eyes grossly normal to inspection, PERRL and conjunctivae and sclerae normal  HENT: ear canals and TM's normal, nose and mouth without ulcers or lesions  NECK: no adenopathy, no asymmetry, masses, or scars, and thyromegaly of the left lobe approximately 2 times normal  RESP: lungs clear to auscultation - no rales, rhonchi or wheezes  BREAST: patient declined   CV: regular rate and rhythm, normal S1 S2, no S3 or S4, " no murmur, click or rub, no peripheral edema  ABDOMEN: soft, nontender, no hepatosplenomegaly, no masses and bowel sounds normal  MS: no gross musculoskeletal defects noted, no edema  SKIN: no suspicious lesions or rashes  NEURO: Normal strength and tone, mentation intact and speech normal  PSYCH: mentation appears normal, affect normal/bright  Rectal: 2 anal skin tags , one fissure at 6 o'clock, no external hemorrhoids.      5/20/2025   Mini Cog   Clock Draw Score 2 Normal   3 Item Recall 3 objects recalled   Mini Cog Total Score 5     {A Mini-Cog total score of 0-2 suggests the possibility of dementia, score of 3-5 suggests no dementia:622314}         Signed Electronically by: Rosaline Carr PA-C  {Email feedback regarding this note to [email or web address]   :[id number]}

## 2025-05-21 ENCOUNTER — PATIENT OUTREACH (OUTPATIENT)
Dept: CARE COORDINATION | Facility: CLINIC | Age: 55
End: 2025-05-21
Payer: MEDICARE

## 2025-05-21 ENCOUNTER — TELEPHONE (OUTPATIENT)
Dept: GASTROENTEROLOGY | Facility: CLINIC | Age: 55
End: 2025-05-21
Payer: MEDICARE

## 2025-05-21 LAB
ANION GAP SERPL CALCULATED.3IONS-SCNC: 9 MMOL/L (ref 7–15)
BUN SERPL-MCNC: 15.2 MG/DL (ref 6–20)
CALCIUM SERPL-MCNC: 9.8 MG/DL (ref 8.8–10.4)
CHLORIDE SERPL-SCNC: 105 MMOL/L (ref 98–107)
CHOLEST SERPL-MCNC: 180 MG/DL
CREAT SERPL-MCNC: 0.89 MG/DL (ref 0.51–0.95)
EGFRCR SERPLBLD CKD-EPI 2021: 76 ML/MIN/1.73M2
FASTING STATUS PATIENT QL REPORTED: NO
FASTING STATUS PATIENT QL REPORTED: NO
GLUCOSE SERPL-MCNC: 93 MG/DL (ref 70–99)
HCO3 SERPL-SCNC: 26 MMOL/L (ref 22–29)
HDLC SERPL-MCNC: 67 MG/DL
LDLC SERPL CALC-MCNC: 86 MG/DL
NONHDLC SERPL-MCNC: 113 MG/DL
POTASSIUM SERPL-SCNC: 4.5 MMOL/L (ref 3.4–5.3)
SODIUM SERPL-SCNC: 140 MMOL/L (ref 135–145)
TRIGL SERPL-MCNC: 136 MG/DL
TSH SERPL DL<=0.005 MIU/L-ACNC: 0.73 UIU/ML (ref 0.3–4.2)

## 2025-05-21 NOTE — TELEPHONE ENCOUNTER
Pre assessment completed for upcoming procedure.   (Please see previous telephone encounter notes for complete details)    Patient returned call.       Procedure details:    Approximate time and facility location reviewed.   Patient is aware that endoscopy team will be calling about 2 days prior to confirm arrival time.    Designated  policy reviewed and that site requests drivers to check in and stay on campus. Instructed to have someone stay 6  hours post procedure.   *Disclaimer - please notify the [facility] RN GI staff with any  issues/concerns.    Medication review:    Medications reviewed. Please see supporting documentation below. Holding recommendations discussed (if applicable).   N/A      Prep for procedure:     Procedure prep instructions reviewed.        Any additional information needed:  N/A      Patient verbalized understanding and had no questions or concerns at this time.      Chelsey Hernandez RN  Endoscopy Procedure Pre Assessment   [phone] option 3

## 2025-05-21 NOTE — TELEPHONE ENCOUNTER
Pre visit planning completed.      Procedure details:    Patient scheduled for Colonoscopy on 6/4/25.     Approximate arrival time: 1215. Procedure time 1300.   *Ensure patient is aware that endoscopy team will be calling about 2 days prior to procedure date to confirm arrival time as this may change.     Facility location: [facility]; [address], [place] [address]. Check in location: 2nd Floor at Surgery desk.  *Disclaimer: Drivers are to check in with patient and stay on campus during procedure.     Sedation type: Conscious sedation     Pre op exam needed? No.    Indication for procedure: Screening       Chart review:     Electronic implanted devices? No    Recent diagnosis of diverticulitis within the last 6 weeks? No      Medication review:    Diabetic? No    Anticoagulants? No    Weight loss medication/injectable? No GLP-1 medication per patient's medication list. Nursing to verify with pre-assessment call.    Other medication HOLDING recommendations:  N/A      Prep for procedure:     Bowel prep recommendation: Standard Miralax.   Due to: standard bowel prep    Procedure information and instructions sent via [employer]         Traci Alfaro RN  Endoscopy Procedure Pre Assessment   [phone] option 3

## 2025-05-21 NOTE — TELEPHONE ENCOUNTER
Attempted to contact patient in order to complete pre assessment questions.     No answer. Left message to return call to [phone] option 3.    Callback communication sent via [employer].    Alma Lainez LPN

## 2025-05-28 ENCOUNTER — TELEPHONE (OUTPATIENT)
Dept: GASTROENTEROLOGY | Facility: CLINIC | Age: 55
End: 2025-05-28
Payer: MEDICARE

## 2025-05-28 NOTE — TELEPHONE ENCOUNTER
Diagnosis, Referred by & from: Anal skin tag // per pt // recs in Epic // Rosaline Carr PA-C - [facility]   Appt date: 6/13/25   NOTES STATUS DETAILS   OFFICE NOTE from referring provider Internal 5/20/25, 3/6/23, 9/22/21, 9/15/20 - [facility] FP   OFFICE NOTE from other specialist Internal [facility]:  6/9/23 - OV FP w/ Leydi Corea MD     6/4/18 - [facility] FP w/ Lisa Ennis PA-C    DISCHARGE SUMMARY from hospital N/A    DISCHARGE REPORT from the ER [facility]:  6/5/18 - ED note w/ Lisa Ennis PA-C    OPERATIVE REPORT N/A    MEDICATION LIST Internal    LABS     ANAL PAP/CEA N/A    BIOPSIES/PATHOLOGY RELATED TO DIAGNOSIS N/A    DIAGNOSTIC PROCEDURES     PFC TESTING (from the Pelvic floor center includes Manometry, PDNL, EMG, etc.) N/A    COLONOSCOPY (most recent all time after 5 years) In process 6/4/25*   FLEX SIGMOIDOSCOPY N/A    UPPER ENDOSCOPY (EGD) N/A    ERCP N/A    IMAGING (DISC & REPORT)      CT Internal [facility]:  6/5/18 - CT ABDOMEN PELVIS     MRI N/A    XRAY Internal [facility]:  6/4/18 - XR ABDOMEN    ULTRASOUND  (ENDOANAL/ENDORECTAL) N/A      WAITING FOR FUTURE COLONOSCOPY PROCEDURE ON 6/4

## 2025-05-28 NOTE — TELEPHONE ENCOUNTER
Caller: PT    Reason for Reschedule/Cancellation (please be detailed, any staff messages or encounters to note?):    CONFLICT    Did you cancel or rescheduled an EUS procedure? No.    Is screening questionnaire older than 3 months from the reschedule date.   If Yes, please complete screening questionnaire. No    Prior to reschedule please review:  Ordering Provider:     VENECIA CORREA     Sedation Determined: CS  Does patient have any ASC Exclusions, please identify?:     Notes on Cancelled Procedure:  Procedure: Lower Endoscopy [Colonoscopy]   Date: 6/4  Location: [facility]; [address], [place] [address]   Surgeon: MERRILL    Rescheduled: Yes,   Procedure: Lower Endoscopy [Colonoscopy]    Date: 6/25   Location: [facility]; [address], [place] [address]    Surgeon: MERRILL   Sedation Level Scheduled  CS ,  Reason for Sedation Level ORDER   Instructions updated and sent: Y     Does patient need PAC or Pre -Op Rescheduled? : N

## 2025-06-03 ENCOUNTER — RESULTS FOLLOW-UP (OUTPATIENT)
Dept: FAMILY MEDICINE | Facility: CLINIC | Age: 55
End: 2025-06-03

## 2025-06-03 DIAGNOSIS — E04.1 LEFT THYROID NODULE: Primary | ICD-10-CM

## 2025-06-10 ENCOUNTER — TELEPHONE (OUTPATIENT)
Dept: FAMILY MEDICINE | Facility: CLINIC | Age: 55
End: 2025-06-10
Payer: MEDICARE

## 2025-06-10 NOTE — TELEPHONE ENCOUNTER
Patient called and asked why [facility] only gave her 9 tablets for sumatriptan. She said it is working really well for her migraines.    RN reviewed patient's chart and noted Rosaline Carr PA-C sent the following:     Disp Refills Start End YANET   SUMAtriptan (IMITREX) 25 MG tablet 30 tablet 1 5/20/2025 -- No   Sig - Route: Take 1-2 tablets (25-50 mg) by mouth at onset of headache for migraine. May repeat dose in 2 hours.  Do not exceed 200 mg in 24 hours - Oral     Writer advised to talk with the Pharmacist to see if it was her insurance. She agreed and will check with them.     She denied further questions or concerns at this time.    Chelsey Hanson, RN, BSN, PHN  [facility]

## 2025-06-13 ENCOUNTER — PRE VISIT (OUTPATIENT)
Dept: SURGERY | Facility: CLINIC | Age: 55
End: 2025-06-13

## 2025-06-19 ENCOUNTER — ANCILLARY PROCEDURE (OUTPATIENT)
Dept: ULTRASOUND IMAGING | Facility: CLINIC | Age: 55
End: 2025-06-19
Attending: PHYSICIAN ASSISTANT
Payer: MEDICARE

## 2025-06-19 ENCOUNTER — ANCILLARY PROCEDURE (OUTPATIENT)
Dept: MAMMOGRAPHY | Facility: CLINIC | Age: 55
End: 2025-06-19
Attending: PHYSICIAN ASSISTANT
Payer: MEDICARE

## 2025-06-19 DIAGNOSIS — Z12.31 VISIT FOR SCREENING MAMMOGRAM: ICD-10-CM

## 2025-06-19 DIAGNOSIS — E04.9 ENLARGEMENT OF THYROID: ICD-10-CM

## 2025-06-19 PROCEDURE — 77067 SCR MAMMO BI INCL CAD: CPT

## 2025-06-19 PROCEDURE — 77063 BREAST TOMOSYNTHESIS BI: CPT

## 2025-06-19 PROCEDURE — 76536 US EXAM OF HEAD AND NECK: CPT

## 2025-06-20 ENCOUNTER — TELEPHONE (OUTPATIENT)
Dept: FAMILY MEDICINE | Facility: CLINIC | Age: 55
End: 2025-06-20
Payer: MEDICARE

## 2025-06-20 NOTE — TELEPHONE ENCOUNTER
Plan does not cover SUMAtriptan (IMITREX) 25 MG tablet .      Please go to [email or web address] to initiate Prior Auth or switch to alternative medication.    Insurance type and ID number: not given.      Additional Information: [phone], or send alternative.    Mica Maguire

## 2025-06-23 ENCOUNTER — TELEPHONE (OUTPATIENT)
Dept: FAMILY MEDICINE | Facility: CLINIC | Age: 55
End: 2025-06-23
Payer: MEDICARE

## 2025-06-23 NOTE — TELEPHONE ENCOUNTER
Retail Pharmacy Prior Authorization Team   Phone: [phone]    PA Initiation    Medication: SUMATRIPTAN SUCCINATE 25 MG PO TABS  Insurance Company: [employer] - Phone [phone] Fax [phone]  Pharmacy Filling the Rx: [id number] - [place], [place] - [address]  Filling Pharmacy Phone: [phone]  Filling Pharmacy Fax:    Start Date: 6/23/2025    MARTI SALCEDO (Key: [id number])

## 2025-06-23 NOTE — TELEPHONE ENCOUNTER
Test Results        Who ordered the test:  pcp    Type of test: Ultrasound    Date of test:  6/19/2025    Where was the test performed:  [facility]    What are your questions/concerns?:      Could we send this information to you [facility] or would you prefer to receive a phone call?:   Patient would prefer a phone call   Okay to leave a detailed message?: Yes at Cell number on file:    Telephone Information:   Mobile [phone]

## 2025-06-23 NOTE — TELEPHONE ENCOUNTER
Addressed in result note.  If patient prefers a call please relay the following  Ultrasound showed that you have left thyroid nodule. Its advised that you schedule a biopsy of this nodule. I have placed referral for general surgery in thyroid specialty, please schedule appointment.     Rosaline Carr PAC

## 2025-06-23 NOTE — TELEPHONE ENCOUNTER
RN called patient.   Attempt # 1 of 3.    Left message to call back and talk with a nurse.    Reason for call: relay message from provider.     When they return call, please do the following:  Relay message from provider.  Warm transfer to General surgery as appropriate [phone].     Chelsey Hanson, RN, BSN, PHN  [facility]

## 2025-06-24 ENCOUNTER — PATIENT OUTREACH (OUTPATIENT)
Dept: CARE COORDINATION | Facility: CLINIC | Age: 55
End: 2025-06-24
Payer: MEDICARE

## 2025-06-24 ENCOUNTER — TELEPHONE (OUTPATIENT)
Dept: GASTROENTEROLOGY | Facility: CLINIC | Age: 55
End: 2025-06-24
Payer: MEDICARE

## 2025-06-24 NOTE — TELEPHONE ENCOUNTER
Prior Authorization Approval    Medication: SUMATRIPTAN SUCCINATE 25 MG PO TABS  Authorization Effective Date: 1/1/2025  Authorization Expiration Date: 12/31/2025  Insurance Company: [employer] - Phone [phone] Fax [phone]  Which Pharmacy is filling the prescription: [id number] - [place], [place] - [address]  Pharmacy Notified: YES  Patient Notified: YES (faxed approval letter to pharmacy and notified patient via [employer] message)

## 2025-06-24 NOTE — TELEPHONE ENCOUNTER
2nd attempt - This writer attempted to contact patient on 06/24/25      Reason for call results and left message (patient hasn't read result message via MyC).      If patient calls back:   Route to RN line, relay provider message below. Gen surgery number [phone]:    Addressed in result note.    If patient prefers a call please relay the following  Ultrasound showed that you have left thyroid nodule. Its advised that you schedule a biopsy of this nodule. I have placed referral for general surgery in thyroid specialty, please schedule appointment.      Rosaline Carr PAC      _______________________________________________    Nga Brewer RN, BSN  [facility]  [place], [place] and [place]

## 2025-06-24 NOTE — TELEPHONE ENCOUNTER
Bowel Prep Review:  Disclaimer: No call was made to the patient.     Standard Miralax bowel prep.    Recommended due to standard bowel prep.   Instructions were sent via [employer]      Alma Lainez LPN  Endoscopy Procedure Pre Assessment

## 2025-06-25 NOTE — TELEPHONE ENCOUNTER
Patient read enGreet message.  Closing encounter.    Adelita Rascon RN, BSN  [facility]

## 2025-07-02 ENCOUNTER — OFFICE VISIT (OUTPATIENT)
Dept: SURGERY | Facility: CLINIC | Age: 55
End: 2025-07-02
Attending: PHYSICIAN ASSISTANT
Payer: MEDICARE

## 2025-07-02 VITALS
HEART RATE: 87 BPM | WEIGHT: 153.9 LBS | BODY MASS INDEX: 25.64 KG/M2 | TEMPERATURE: 98.4 F | RESPIRATION RATE: 16 BRPM | HEIGHT: 65 IN | OXYGEN SATURATION: 100 % | SYSTOLIC BLOOD PRESSURE: 113 MMHG | DIASTOLIC BLOOD PRESSURE: 72 MMHG

## 2025-07-02 DIAGNOSIS — E04.1 LEFT THYROID NODULE: ICD-10-CM

## 2025-07-02 PROCEDURE — G0463 HOSPITAL OUTPT CLINIC VISIT: HCPCS | Performed by: SURGERY

## 2025-07-02 RX ORDER — METHOCARBAMOL 500 MG/1
500 TABLET, FILM COATED ORAL 4 TIMES DAILY PRN
COMMUNITY
Start: 2025-05-20

## 2025-07-02 ASSESSMENT — PAIN SCALES - GENERAL: PAINLEVEL_OUTOF10: NO PAIN (0)

## 2025-07-02 NOTE — LETTER
7/2/2025      Denilson Sweet  [address] [place] [id number]      Dear Colleague,    Thank you for referring your patient, Denilson Sweet, to the [facility]. Please see a copy of my visit note below.    SURGERY CLINIC CONSULTATION    REASON FOR CONSULTATION:  Denilson Sweet was referred by Lilly HUMPHRIES for evaluation and discussion of treatment options for left thyroid nodule.     HISTORY OF PRESENT ILLNESS:  Denilson Sweet is a 54 year old female who upon exam with her primary care provider was noted to have a fullness in her left neck.  This led to an ultrasound that identified a very small right 0.8 cm thyroid nodule and a larger left 4.6 cm thyroid nodule.  The patient's thyroid function tests are within normal limits.    The patient denies any symptoms of hypo or hyperthyroidism.  She is on lithium.  She has no problems with voice quality inspiration or swallowing.  No previous head neck radiation treatment.  No family or previous history of thyroid carcinoma.      REVIEW OF SYSTEMS:  ROS EXAM: 10 point view of systems is pertinent for that noted in HPI  Patient Active Problem List   Diagnosis     Genital herpes     CARDIOVASCULAR SCREENING; LDL GOAL LESS THAN 160     Bipolar 1 disorder (H)     Migraine     Closed displaced fracture of head of right radius, initial encounter       Past Surgical History:   Procedure Laterality Date     BIOPSY BREAST      RT     LAPAROSCOPY PROCEDURE UNLISTED      ABLATION       Allergies   Allergen Reactions     Valproic Acid Rash     Lamotrigine      PN: Anxiety, mood swings, manic symptoms       Medications reviewed in the EMR        Family History   Problem Relation Age of Onset     Osteoporosis Mother      Cancer - colorectal Paternal Grandmother         PHYSICAL EXAM:  /72 (BP Location: Right arm, Patient Position: Sitting, Cuff Size: Adult Regular)   Pulse 87   Temp 98.4  F (36.9  C) (Oral)   Resp 16   Ht  "1.651 m (5' 5\")   Wt 69.8 kg (153 lb 14.4 oz)   LMP 07/06/2017   SpO2 100%   BMI 25.61 kg/m      Neck: On inspection you can see a fullness in the left anterior neck.  Palpation trachea is midline.  There is no cervical lymphadenopathy.  The superior and inferior borders of the thyroid gland are palpable.  There is a large left thyroid nodule measuring almost 5 cm that is very soft appears well-circumscribed within the thyroid gland.    I personally reviewed the radiographic images and laboratory data    ASSESSMENT:   1. Left thyroid nodule        PLAN:   I recommend the patient undergo a left thyroid lobectomy and isthmusectomy.  Following this we will discuss the results and if needed will have a molecular analysis via [facility].  If it is benign and the patient is asymptomatic it is reasonable to continue to continue monitoring this, if the nodule is indeterminate we will follow the guidance of the [facility].  If the nodule is consistent with papillary thyroid carcinoma based on size then I would recommend a total thyroidectomy.  We discussed all of the options or possibilities with the patient.  We also discussed the risks including but not limited to bleeding infection injury to the recurrent laryngeal nerve or nerves potential permanent hypocalcemia.  And possibility of thyroid hormone replacement.    Mamta Vazquez MD              Again, thank you for allowing me to participate in the care of your patient.        Sincerely,        Mamta Vazquez MD    Electronically signed"

## 2025-07-02 NOTE — NURSING NOTE
"Oncology Rooming Note    July 2, 2025 11:29 AM   Denilson Sweet is a 54 year old female who presents for:    Chief Complaint   Patient presents with    Oncology Clinic Visit     Left thyroid nodule     Initial Vitals: /72 (BP Location: Right arm, Patient Position: Sitting, Cuff Size: Adult Regular)   Pulse 87   Temp 98.4  F (36.9  C) (Oral)   Resp 16   Ht 1.651 m (5' 5\")   Wt 69.8 kg (153 lb 14.4 oz)   LMP 07/06/2017   SpO2 100%   BMI 25.61 kg/m   Estimated body mass index is 25.61 kg/m  as calculated from the following:    Height as of this encounter: 1.651 m (5' 5\").    Weight as of this encounter: 69.8 kg (153 lb 14.4 oz). Body surface area is 1.79 meters squared.  No Pain (0) Comment: Data Unavailable   Patient's last menstrual period was 07/06/2017.  Allergies reviewed: Yes  Medications reviewed: Yes    Medications: Medication refills not needed today.  Pharmacy name entered into EPIC:    [email or web address]  [facility]/PHARMACY #[id number] - [place], [place] - [address]    Frailty Screening:   Is the patient here for a new oncology consult visit in cancer care? 2. No    PHQ9:  Did this patient require a PHQ9?: No      Clinical concerns: none      Zack Escalante              "

## 2025-07-08 ENCOUNTER — OFFICE VISIT (OUTPATIENT)
Dept: PODIATRY | Facility: CLINIC | Age: 55
End: 2025-07-08
Attending: PHYSICIAN ASSISTANT
Payer: MEDICARE

## 2025-07-08 DIAGNOSIS — M21.612 BILATERAL BUNIONS: ICD-10-CM

## 2025-07-08 DIAGNOSIS — M21.611 BILATERAL BUNIONS: ICD-10-CM

## 2025-07-08 PROCEDURE — 99203 OFFICE O/P NEW LOW 30 MIN: CPT | Performed by: PODIATRIST

## 2025-07-08 NOTE — NURSING NOTE
Denilson Sweet's chief complaint for this visit includes:  Chief Complaint   Patient presents with    Consult     Bunions, bilateral      PCP: Rosaline Carr    Referring Provider:  Rosaline Carr PA-C  [id number] [place] [address]    [facility] 07/06/2017   Data Unavailable     Do you need any medication refills at today's visit? NO    Allergies   Allergen Reactions    Valproic Acid Rash    Lamotrigine      PN: Anxiety, mood swings, manic symptoms       Janay Humphries LPN

## 2025-07-08 NOTE — PROGRESS NOTES
Past Medical History:   Diagnosis Date    Bipolar affective (H)     Genital herpes     NONSPECIFIC MEDICAL HISTORY     ENDOMETRIOSIS&ANDEOMYOSIS     Patient Active Problem List   Diagnosis    Genital herpes    CARDIOVASCULAR SCREENING; LDL GOAL LESS THAN 160    Bipolar 1 disorder (H)    Migraine    Closed displaced fracture of head of right radius, initial encounter     Past Surgical History:   Procedure Laterality Date    BIOPSY BREAST      RT    LAPAROSCOPY PROCEDURE UNLISTED      ABLATION     Social History     Socioeconomic History    Marital status:      Spouse name: Not on file    Number of children: Not on file    Years of education: Not on file    Highest education level: Not on file   Occupational History    Not on file   Tobacco Use    Smoking status: Never     Passive exposure: Never    Smokeless tobacco: Never   Vaping Use    Vaping status: Never Used   Substance and Sexual Activity    Alcohol use: No    Drug use: No    Sexual activity: Yes     Partners: Male     Birth control/protection: Pill   Other Topics Concern    Parent/sibling w/ CABG, MI or angioplasty before 65F 55M? Not Asked   Social History Narrative    Not on file     Social Drivers of Health     Financial Resource Strain: Low Risk  (5/19/2025)    Financial Resource Strain     Within the past 12 months, have you or your family members you live with been unable to get utilities (heat, electricity) when it was really needed?: No   Food Insecurity: Low Risk  (5/19/2025)    Food Insecurity     Within the past 12 months, did you worry that your food would run out before you got money to buy more?: No     Within the past 12 months, did the food you bought just not last and you didn t have money to get more?: No   Transportation Needs: Low Risk  (5/19/2025)    Transportation Needs     Within the past 12 months, has lack of transportation kept you from medical appointments, getting your medicines, non-medical meetings or appointments, work,  or from getting things that you need?: No   Physical Activity: Sufficiently Active (5/19/2025)    Exercise Vital Sign     Days of Exercise per Week: 5 days     Minutes of Exercise per Session: 50 min   Stress: No Stress Concern Present (5/19/2025)    [employer] [employer] - Occupational Stress Questionnaire     Feeling of Stress : Only a little   Social Connections: Unknown (5/19/2025)    Social Connection and Isolation Panel [NHANES]     Frequency of Communication with Friends and Family: Not on file     Frequency of Social Gatherings with Friends and Family: Once a week     Attends Mormonism Services: Not on file     Active Member of Clubs or Organizations: Not on file     Attends Club or Organization Meetings: Not on file     Marital Status: Not on file   Interpersonal Safety: Low Risk  (5/20/2025)    Interpersonal Safety     Do you feel physically and emotionally safe where you currently live?: Yes     Within the past 12 months, have you been hit, slapped, kicked or otherwise physically hurt by someone?: No     Within the past 12 months, have you been humiliated or emotionally abused in other ways by your partner or ex-partner?: No   Housing Stability: Low Risk  (5/19/2025)    Housing Stability     Do you have housing? : Yes     Are you worried about losing your housing?: No     Family History   Problem Relation Age of Onset    Osteoporosis Mother     Cancer - colorectal Paternal Grandmother            Subjective [id number]-old presents from family practice for bunions bilaterally.  Relates she has had bunions for at least a couple years but they have recently become painful with achy type pain, she relates she she wears tennis shoes, relates if she wears shoes or walks too much they get sore, relates no treatment, relates no injuries.    Objective findings- DP and PT are 2 out of 4 bilaterally.  Has laterally deviated Hallux with dorsomedial first MPJ prominence bilaterally.  Has dorsal  contracted digits 2 through 5 bilaterally.  Relates the pains in the dorsal medial first MPJ prominence when it occurs.  There is no erythema, no edema, no drainage, no odor, no calor, no tendon voids, no pain on palpation bilaterally.  X-rays 3 views right and left foot from 5/20/2025 reviewed report and reviewed x-rays with joint cortical margins intact laterally deviated hallux and sesamoids noted.    Assessment and plan- Hallux valgus with bunions bilaterally.  Diagnosis and treatment options discussed with the patient.  Patient is advised on stretching and wide shoe gear.  Referral to orthopedic surgery given and use discussed with the patient.  Return to clinic and see me as needed or if she wants conservative treatment.                                        Low level of medical decision making.

## 2025-07-08 NOTE — LETTER
7/8/2025      Denilson Sweet  [address] [place] [phone]      Dear Colleague,    Thank you for referring your patient, Denilson Sweet, to the [facility]. Please see a copy of my visit note below.    Past Medical History:   Diagnosis Date    Bipolar affective (H)     Genital herpes     NONSPECIFIC MEDICAL HISTORY     ENDOMETRIOSIS&ANDEOMYOSIS     Patient Active Problem List   Diagnosis    Genital herpes    CARDIOVASCULAR SCREENING; LDL GOAL LESS THAN 160    Bipolar 1 disorder (H)    Migraine    Closed displaced fracture of head of right radius, initial encounter     Past Surgical History:   Procedure Laterality Date    BIOPSY BREAST      RT    LAPAROSCOPY PROCEDURE UNLISTED      ABLATION     Social History     Socioeconomic History    Marital status:      Spouse name: Not on file    Number of children: Not on file    Years of education: Not on file    Highest education level: Not on file   Occupational History    Not on file   Tobacco Use    Smoking status: Never     Passive exposure: Never    Smokeless tobacco: Never   Vaping Use    Vaping status: Never Used   Substance and Sexual Activity    Alcohol use: No    Drug use: No    Sexual activity: Yes     Partners: Male     Birth control/protection: Pill   Other Topics Concern    Parent/sibling w/ CABG, MI or angioplasty before 65F 55M? Not Asked   Social History Narrative    Not on file     Social Drivers of Health     Financial Resource Strain: Low Risk  (5/19/2025)    Financial Resource Strain     Within the past 12 months, have you or your family members you live with been unable to get utilities (heat, electricity) when it was really needed?: No   Food Insecurity: Low Risk  (5/19/2025)    Food Insecurity     Within the past 12 months, did you worry that your food would run out before you got money to buy more?: No     Within the past 12 months, did the food you bought just not last and you didn t have  money to get more?: No   Transportation Needs: Low Risk  (5/19/2025)    Transportation Needs     Within the past 12 months, has lack of transportation kept you from medical appointments, getting your medicines, non-medical meetings or appointments, work, or from getting things that you need?: No   Physical Activity: Sufficiently Active (5/19/2025)    Exercise Vital Sign     Days of Exercise per Week: 5 days     Minutes of Exercise per Session: 50 min   Stress: No Stress Concern Present (5/19/2025)    [employer] - Occupational Stress Questionnaire     Feeling of Stress : Only a little   Social Connections: Unknown (5/19/2025)    Social Connection and Isolation Panel [NHANES]     Frequency of Communication with Friends and Family: Not on file     Frequency of Social Gatherings with Friends and Family: Once a week     Attends Restorationist Services: Not on file     Active Member of Clubs or Organizations: Not on file     Attends Club or Organization Meetings: Not on file     Marital Status: Not on file   Interpersonal Safety: Low Risk  (5/20/2025)    Interpersonal Safety     Do you feel physically and emotionally safe where you currently live?: Yes     Within the past 12 months, have you been hit, slapped, kicked or otherwise physically hurt by someone?: No     Within the past 12 months, have you been humiliated or emotionally abused in other ways by your partner or ex-partner?: No   Housing Stability: Low Risk  (5/19/2025)    Housing Stability     Do you have housing? : Yes     Are you worried about losing your housing?: No     Family History   Problem Relation Age of Onset    Osteoporosis Mother     Cancer - colorectal Paternal Grandmother        Subjective [id number]-old presents from family practice for bunions bilaterally.  Relates she has had bunions for at least a couple years but they have recently become painful with achy type pain, she relates she she wears tennis shoes,  relates if she wears shoes or walks too much they get sore, relates no treatment, relates no injuries.    Objective findings- DP and PT are 2 out of 4 bilaterally.  Has laterally deviated Hallux with dorsomedial first MPJ prominence bilaterally.  Has dorsal contracted digits 2 through 5 bilaterally.  Relates the pains in the dorsal medial first MPJ prominence when it occurs.  There is no erythema, no edema, no drainage, no odor, no calor, no tendon voids, no pain on palpation bilaterally.  X-rays 3 views right and left foot from 5/20/2025 reviewed report and reviewed x-rays with joint cortical margins intact laterally deviated hallux and sesamoids noted.    Assessment and plan- Hallux valgus with bunions bilaterally.  Diagnosis and treatment options discussed with the patient.  Patient is advised on stretching and wide shoe gear.  Referral to orthopedic surgery given and use discussed with the patient.  Return to clinic and see me as needed or if she wants conservative treatment.          Low level of medical decision making.      Again, thank you for allowing me to participate in the care of your patient.        Sincerely,        Blair Najera DPM    Electronically signed

## 2025-07-11 ENCOUNTER — ANCILLARY PROCEDURE (OUTPATIENT)
Dept: ULTRASOUND IMAGING | Facility: CLINIC | Age: 55
End: 2025-07-11
Attending: SURGERY
Payer: MEDICARE

## 2025-07-11 DIAGNOSIS — E04.1 THYROID NODULE: ICD-10-CM

## 2025-07-11 PROCEDURE — 88173 CYTOPATH EVAL FNA REPORT: CPT | Performed by: PATHOLOGY

## 2025-07-11 PROCEDURE — 10005 FNA BX W/US GDN 1ST LES: CPT

## 2025-07-14 LAB
PATH REPORT.COMMENTS IMP SPEC: NORMAL
PATH REPORT.FINAL DX SPEC: NORMAL
PATH REPORT.GROSS SPEC: NORMAL
PATH REPORT.MICROSCOPIC SPEC OTHER STN: NORMAL
PATH REPORT.RELEVANT HX SPEC: NORMAL

## 2025-07-16 ENCOUNTER — HOSPITAL ENCOUNTER (OUTPATIENT)
Facility: AMBULATORY SURGERY CENTER | Age: 55
Discharge: HOME | End: 2025-07-16
Attending: STUDENT IN AN ORGANIZED HEALTH CARE EDUCATION/TRAINING PROGRAM
Payer: MEDICARE

## 2025-07-16 VITALS
OXYGEN SATURATION: 99 % | DIASTOLIC BLOOD PRESSURE: 66 MMHG | RESPIRATION RATE: 16 BRPM | TEMPERATURE: 97.3 F | SYSTOLIC BLOOD PRESSURE: 124 MMHG | HEART RATE: 59 BPM

## 2025-07-16 LAB — COLONOSCOPY: NORMAL

## 2025-07-16 RX ORDER — PROCHLORPERAZINE MALEATE 10 MG
10 TABLET ORAL EVERY 6 HOURS PRN
Status: DISCONTINUED | OUTPATIENT
Start: 2025-07-16 | End: 2025-07-17 | Stop reason: HOSPADM

## 2025-07-16 RX ORDER — ONDANSETRON 4 MG/1
4 TABLET, ORALLY DISINTEGRATING ORAL EVERY 6 HOURS PRN
Status: DISCONTINUED | OUTPATIENT
Start: 2025-07-16 | End: 2025-07-17 | Stop reason: HOSPADM

## 2025-07-16 RX ORDER — NALOXONE HYDROCHLORIDE 0.4 MG/ML
0.2 INJECTION, SOLUTION INTRAMUSCULAR; INTRAVENOUS; SUBCUTANEOUS
Status: DISCONTINUED | OUTPATIENT
Start: 2025-07-16 | End: 2025-07-17 | Stop reason: HOSPADM

## 2025-07-16 RX ORDER — NALOXONE HYDROCHLORIDE 0.4 MG/ML
0.4 INJECTION, SOLUTION INTRAMUSCULAR; INTRAVENOUS; SUBCUTANEOUS
Status: DISCONTINUED | OUTPATIENT
Start: 2025-07-16 | End: 2025-07-17 | Stop reason: HOSPADM

## 2025-07-16 RX ORDER — LIDOCAINE 40 MG/G
CREAM TOPICAL
Status: DISCONTINUED | OUTPATIENT
Start: 2025-07-16 | End: 2025-07-17 | Stop reason: HOSPADM

## 2025-07-16 RX ORDER — FLUMAZENIL 0.1 MG/ML
0.2 INJECTION, SOLUTION INTRAVENOUS
Status: DISCONTINUED | OUTPATIENT
Start: 2025-07-16 | End: 2025-07-17 | Stop reason: HOSPADM

## 2025-07-16 RX ORDER — ONDANSETRON 2 MG/ML
4 INJECTION INTRAMUSCULAR; INTRAVENOUS EVERY 6 HOURS PRN
Status: DISCONTINUED | OUTPATIENT
Start: 2025-07-16 | End: 2025-07-17 | Stop reason: HOSPADM

## 2025-07-16 RX ORDER — FENTANYL CITRATE 50 UG/ML
INJECTION, SOLUTION INTRAMUSCULAR; INTRAVENOUS PRN
Status: DISCONTINUED | OUTPATIENT
Start: 2025-07-16 | End: 2025-07-16 | Stop reason: HOSPADM

## 2025-07-16 RX ORDER — ONDANSETRON 2 MG/ML
4 INJECTION INTRAMUSCULAR; INTRAVENOUS
Status: DISCONTINUED | OUTPATIENT
Start: 2025-07-16 | End: 2025-07-17 | Stop reason: HOSPADM

## 2025-07-16 NOTE — H&P
Pre-Endoscopy History and Physical     Denilson Sweet MRN# [id number]   YOB: 1970 Age: 54 year old     Date of Procedure: 07/16/25  Primary care provider: Rosaline Carr  Type of Endoscopy: Colonoscopy  Reason for Procedure: Screening, 1st colonoscopy  Type of Anesthesia Anticipated: Moderate Sedation    HPI:    Denilson is a 54 year old female who will be undergoing the above procedure.      Prior colonoscopy: None    A history and physical has been performed. She was recently seen by [facility] regarding skin tags and intermittent BRBR. She was also recently found to have a thyroid nodule and she has been undergoing further evaluation for that. The patient's medications and allergies have been reviewed. The risks and benefits of the procedure and the sedation options and risks were discussed with the patient.  All questions were answered and informed consent was obtained.      She denies a personal or family history of anesthesia complications or bleeding disorders.   reports a family history of CRC in 2nd degree relative; no 1st degree relatives.   denies a family history of IBD.  denies changes in bowel habits.  reports blood in stool.     Allergies   Allergen Reactions    Valproic Acid Rash    Lamotrigine      PN: Anxiety, mood swings, manic symptoms      Allergy to Latex: NO   Intolerances: NO     Current Outpatient Medications   Medication Sig Dispense Refill    lithium ER (LITHOBID/ESKALITH CR) 300 MG CR tablet Take 300 mg by mouth. Once a day      SUMAtriptan (IMITREX) 25 MG tablet Take 1-2 tablets (25-50 mg) by mouth at onset of headache for migraine. May repeat dose in 2 hours.  Do not exceed 200 mg in 24 hours 30 tablet 1    zolpidem (AMBIEN) 5 MG tablet Take 10 mg by mouth nightly as needed for sleep       methocarbamol (ROBAXIN) 500 MG tablet Take 500 mg by mouth 4 times daily as needed for muscle spasms.      tiZANidine (ZANAFLEX) 4 MG tablet Take 1 tablet (4 mg) by mouth 3  "times daily as needed for muscle spasms. 30 tablet 1     Current Facility-Administered Medications   Medication Dose Route Frequency Provider Last Rate Last Admin    lidocaine (LMX4) kit   Topical Q1H PRN Chelita Mcdermott MD        lidocaine 1 % 0.1-1 mL  0.1-1 mL Other Q1H PRN Chelita Mcdermott MD        ondansetron (ZOFRAN) injection 4 mg  4 mg Intravenous Once PRN Chelita Mcdermott MD        sodium chloride (PF) 0.9% PF flush 3 mL  3 mL Intracatheter Q8H [facility] Chelita Mcdermott MD        sodium chloride (PF) 0.9% PF flush 3 mL  3 mL Intracatheter q1 min prn Chelita Mcdermott MD           Patient Active Problem List   Diagnosis    Genital herpes    CARDIOVASCULAR SCREENING; LDL GOAL LESS THAN 160    Bipolar 1 disorder (H)    Migraine    Closed displaced fracture of head of right radius, initial encounter        Past Medical History:   Diagnosis Date    Bipolar affective (H)     Genital herpes     NONSPECIFIC MEDICAL HISTORY     ENDOMETRIOSIS&ANDEOMYOSIS        Past Surgical History:   Procedure Laterality Date    BIOPSY BREAST      RT    LAPAROSCOPY PROCEDURE UNLISTED      ABLATION       Social History     Tobacco Use    Smoking status: Never     Passive exposure: Never    Smokeless tobacco: Never   Substance Use Topics    Alcohol use: No       Family History   Problem Relation Age of Onset    Osteoporosis Mother     Cancer - colorectal Paternal Grandmother        REVIEW OF SYSTEMS:     5 point ROS negative except as noted above in HPI, including Gen., Resp., CV, GI &  system review.      PHYSICAL EXAM:   LMP 07/06/2017  Estimated body mass index is 25.61 kg/m  as calculated from the following:    Height as of 7/2/25: 1.651 m (5' 5\").    Weight as of 7/2/25: 69.8 kg (153 lb 14.4 oz).   All Vitals have been reviewed.    GENERAL APPEARANCE: healthy and alert  MENTAL STATUS: alert  AIRWAY EXAM: Mallampatti Class I (visualization of the soft palate, fauces, uvula, anterior and posterior pillars)  RESP: lungs " clear to auscultation - no rales, rhonchi or wheezes  CV: regular rates and rhythm      IMPRESSION   ASA Class 2 - Mild systemic disease        PLAN:     Plan for colonoscopy. We discussed the risks, benefits and alternatives and the patient wished to proceed.          Chelita Mcdermott MD  [facility]  Phone: [phone]  Fax: [phone]  July 16, 2025

## 2025-07-20 DIAGNOSIS — G43.009 MIGRAINE WITHOUT AURA AND WITHOUT STATUS MIGRAINOSUS, NOT INTRACTABLE: ICD-10-CM

## 2025-07-21 RX ORDER — SUMATRIPTAN SUCCINATE 25 MG/1
TABLET ORAL
Qty: 30 TABLET | Refills: 1 | OUTPATIENT
Start: 2025-07-21

## 2025-07-21 NOTE — TELEPHONE ENCOUNTER
Addended by: GINGER GAN on: 7/21/2025 05:14 PM     Modules accepted: Orders     Approved  Benita MARROQUIN

## 2025-07-30 ENCOUNTER — OFFICE VISIT (OUTPATIENT)
Dept: PODIATRY | Facility: CLINIC | Age: 55
End: 2025-07-30
Attending: PODIATRIST
Payer: MEDICARE

## 2025-07-30 DIAGNOSIS — M21.611 BILATERAL BUNIONS: Primary | ICD-10-CM

## 2025-07-30 DIAGNOSIS — M21.612 BILATERAL BUNIONS: Primary | ICD-10-CM

## 2025-07-30 PROBLEM — D12.6 ADENOMATOUS POLYP OF COLON: Status: ACTIVE | Noted: 2025-07-30

## 2025-07-30 PROCEDURE — 99214 OFFICE O/P EST MOD 30 MIN: CPT | Performed by: PODIATRIST

## 2025-07-30 NOTE — PATIENT INSTRUCTIONS
We wish you continued good healing. If you have any questions or concerns, please do not hesitate to contact us at  [phone]    [employer] (secure e-mail communication and access to your chart) to send a message or to make an appointment.    Please remember to call and schedule a follow up appointment if one was recommended at your earliest convenience.     PODIATRY CLINIC HOURS  TELEPHONE NUMBER    Dr. Cy BELLAMYPJANA FACFAS        Clinics:  [facility]   [place]  Tuesday 1PM-6PM  [place]  Wednesday 745AM-330PM  [place]  Monday 2nd,4th  830AM-4PM  Thursday/Friday 745AM-230PM     [place] APPOINTMENTS  [phone]    [place] APPOINTMENTS  [phone]          If you need a medication refill, please contact us you may need lab work and/or a follow up visit prior to your refill (i.e. Antifungal medications).  If MRI needed please call Imaging at [phone]  HOW DO I GET MY KNEE SCOOTER? Knee scooters can be picked up at ANY Medical Supply stores with your knee scooter Prescription.  OR  Bring your signed prescription to an [facility] Home Medical Equipment showroom.   Set up an appointment for your custom Orthotics. Call any Orthotics locations call [phone]

## 2025-07-30 NOTE — PROGRESS NOTES
Subjective:    Pt is seen today in consult from Dr. Abarca c/c of painful bunions.  This has been symptomatic for years and recently getting worse.  Bothering her more on a daily basis.  She is retired.  She would likes to workout a lot.  Is bothering them.  Pain aggravated by activity and relieved by rest.  Has family history of bunions.  Her mother recently had minimal incision surgery which healed quite quickly and she would like to have this done.  Denies erythema ecchymosis blistering or numbness.     ROS:   see above       Allergies   Allergen Reactions    Valproic Acid Rash    Lamotrigine      PN: Anxiety, mood swings, manic symptoms       Current Outpatient Medications   Medication Sig Dispense Refill    lithium ER (LITHOBID/ESKALITH CR) 300 MG CR tablet Take 300 mg by mouth. Once a day      SUMAtriptan (IMITREX) 25 MG tablet Take 1-2 tablets (25-50 mg) by mouth at onset of headache for migraine. May repeat dose in 2 hours.  Do not exceed 200 mg in 24 hours 30 tablet 1    zolpidem (AMBIEN) 5 MG tablet Take 10 mg by mouth nightly as needed for sleep          Patient Active Problem List   Diagnosis    Genital herpes    CARDIOVASCULAR SCREENING; LDL GOAL LESS THAN 160    Bipolar 1 disorder (H)    Migraine    Closed displaced fracture of head of right radius, initial encounter    Adenomatous polyp of colon       Past Medical History:   Diagnosis Date    Bipolar affective (H)     Genital herpes     NONSPECIFIC MEDICAL HISTORY     ENDOMETRIOSIS&ANDEOMYOSIS       Past Surgical History:   Procedure Laterality Date    BIOPSY BREAST      RT    COLONOSCOPY N/A 7/16/2025    Procedure: COLONOSCOPY, WITH POLYPECTOMY AND BIOPSY;  Surgeon: Chelita Mcdermott MD;  Location: [facility]    COLONOSCOPY, SCREENING N/A 7/16/2025    Procedure: Colonoscopy, Screening;  Surgeon: Chelita Mcdermott MD;  Location: [facility]    LAPAROSCOPY PROCEDURE UNLISTED      ABLATION       Family History   Problem Relation Age of Onset    Osteoporosis  Mother     Cancer - colorectal Paternal Grandmother        Social History     Tobacco Use    Smoking status: Never     Passive exposure: Never    Smokeless tobacco: Never   Substance Use Topics    Alcohol use: No       Objective:    O:  LMP 07/06/2017 .      Constitutional/ general:  Pt is in no apparent distress, appears well-nourished.  Cooperative with history and physical exam.     Psych:  The patient answered questions appropriately.  Normal affect.  Seems to have reasonable expectations, in terms of treatment.     Lungs:  Non labored breathing, non labored speech. No cough.  No audible wheezing. Even, quiet breathing.       Vascular:  Pedal pulses are palpable bilaterally for both the DP and PT arteries.  CFT < 3 sec.  No edema.  Pedal hair growth noted.     Neuro:  Alert and oriented x 3. Coordinated gait.  Light touch sensation is intact to the L4, L5, S1 distributions. No obvious deficits.  No evidence of neurological-based weakness, spasticity, or contracture in the lower extremities.     Derm: Normal texture and turgor.  No erythema, ecchymosis, or cyanosis.  No open lesions.     Musculoskeletal:    Lower extremity muscle strength is normal.  Patient is ambulatory without an assistive device or brace.    Normal arch with weightbearing.   No equinus.    Bilateral bunion deformities noted.  No pain with range of motion.  Negative tracking with ROM.  No medial bursa or masses noted.  No pain on the sesamoids or dorsally.        Radiographic Exam:   X-ray three views foot shows IM angle moderately increased both feet.    Assessment:  Hallux abducto valgus deformity right and left    Plan:  Xray from past personally reviewed.  Briefly discussed bunion surgery today.  She would like MIS surgery.  I do not do this.  Will refer to my colleague Dr. Nergete.  RTC as needed.  Thank you for allowing me participate in the care of this patient.        Cy Morales, VANESA, FACFAS

## 2025-07-30 NOTE — LETTER
7/30/2025      Denilson Sweet  [address] [place] [id number]      Dear Colleague,    Thank you for referring your patient, Denilson Sweet, to the [facility]. Please see a copy of my visit note below.     Subjective:    Pt is seen today in consult from Dr. Abarca c/c of painful bunions.  This has been symptomatic for years and recently getting worse.  Bothering her more on a daily basis.  She is retired.  She would likes to workout a lot.  Is bothering them.  Pain aggravated by activity and relieved by rest.  Has family history of bunions.  Her mother recently had minimal incision surgery which healed quite quickly and she would like to have this done.  Denies erythema ecchymosis blistering or numbness.     ROS:   see above       Allergies   Allergen Reactions     Valproic Acid Rash     Lamotrigine      PN: Anxiety, mood swings, manic symptoms       Current Outpatient Medications   Medication Sig Dispense Refill     lithium ER (LITHOBID/ESKALITH CR) 300 MG CR tablet Take 300 mg by mouth. Once a day       SUMAtriptan (IMITREX) 25 MG tablet Take 1-2 tablets (25-50 mg) by mouth at onset of headache for migraine. May repeat dose in 2 hours.  Do not exceed 200 mg in 24 hours 30 tablet 1     zolpidem (AMBIEN) 5 MG tablet Take 10 mg by mouth nightly as needed for sleep          Patient Active Problem List   Diagnosis     Genital herpes     CARDIOVASCULAR SCREENING; LDL GOAL LESS THAN 160     Bipolar 1 disorder (H)     Migraine     Closed displaced fracture of head of right radius, initial encounter     Adenomatous polyp of colon       Past Medical History:   Diagnosis Date     Bipolar affective (H)      Genital herpes      NONSPECIFIC MEDICAL HISTORY     ENDOMETRIOSIS&ANDEOMYOSIS       Past Surgical History:   Procedure Laterality Date     BIOPSY BREAST      RT     COLONOSCOPY N/A 7/16/2025    Procedure: COLONOSCOPY, WITH POLYPECTOMY AND BIOPSY;  Surgeon: Chelita Mcdermott,  MD;  Location: [facility]     COLONOSCOPY, SCREENING N/A 7/16/2025    Procedure: Colonoscopy, Screening;  Surgeon: Chelita Mcdermott MD;  Location: [facility]     LAPAROSCOPY PROCEDURE UNLISTED      ABLATION       Family History   Problem Relation Age of Onset     Osteoporosis Mother      Cancer - colorectal Paternal Grandmother        Social History     Tobacco Use     Smoking status: Never     Passive exposure: Never     Smokeless tobacco: Never   Substance Use Topics     Alcohol use: No       Objective:    O:  LMP 07/06/2017 .      Constitutional/ general:  Pt is in no apparent distress, appears well-nourished.  Cooperative with history and physical exam.     Psych:  The patient answered questions appropriately.  Normal affect.  Seems to have reasonable expectations, in terms of treatment.     Lungs:  Non labored breathing, non labored speech. No cough.  No audible wheezing. Even, quiet breathing.       Vascular:  Pedal pulses are palpable bilaterally for both the DP and PT arteries.  CFT < 3 sec.  No edema.  Pedal hair growth noted.     Neuro:  Alert and oriented x 3. Coordinated gait.  Light touch sensation is intact to the L4, L5, S1 distributions. No obvious deficits.  No evidence of neurological-based weakness, spasticity, or contracture in the lower extremities.     Derm: Normal texture and turgor.  No erythema, ecchymosis, or cyanosis.  No open lesions.     Musculoskeletal:    Lower extremity muscle strength is normal.  Patient is ambulatory without an assistive device or brace.    Normal arch with weightbearing.   No equinus.    Bilateral bunion deformities noted.  No pain with range of motion.  Negative tracking with ROM.  No medial bursa or masses noted.  No pain on the sesamoids or dorsally.        Radiographic Exam:   X-ray three views foot shows IM angle moderately increased both feet.    Assessment:  Hallux abducto valgus deformity right and left    Plan:  Xray from past personally reviewed.  Briefly  discussed bunion surgery today.  She would like MIS surgery.  I do not do this.  Will refer to my colleague Dr. Negrete.  RTC as needed.  Thank you for allowing me participate in the care of this patient.        Cy Morales DPM, FACFAS        Again, thank you for allowing me to participate in the care of your patient.        Sincerely,        Cy Morales DPM    Electronically signed

## 2025-07-31 ENCOUNTER — PATIENT OUTREACH (OUTPATIENT)
Dept: CARE COORDINATION | Facility: CLINIC | Age: 55
End: 2025-07-31
Payer: MEDICARE

## 2025-07-31 ENCOUNTER — PATIENT OUTREACH (OUTPATIENT)
Dept: GASTROENTEROLOGY | Facility: CLINIC | Age: 55
End: 2025-07-31
Payer: MEDICARE

## (undated) RX ORDER — FENTANYL CITRATE 50 UG/ML
INJECTION, SOLUTION INTRAMUSCULAR; INTRAVENOUS
Status: DISPENSED
Start: 2025-07-16